# Patient Record
Sex: FEMALE | Race: BLACK OR AFRICAN AMERICAN | NOT HISPANIC OR LATINO | Employment: FULL TIME | URBAN - METROPOLITAN AREA
[De-identification: names, ages, dates, MRNs, and addresses within clinical notes are randomized per-mention and may not be internally consistent; named-entity substitution may affect disease eponyms.]

---

## 2020-12-29 ENCOUNTER — OFFICE VISIT (OUTPATIENT)
Dept: PRIMARY CARE CLINIC | Facility: CLINIC | Age: 45
End: 2020-12-29
Payer: COMMERCIAL

## 2020-12-29 ENCOUNTER — LAB VISIT (OUTPATIENT)
Dept: LAB | Facility: HOSPITAL | Age: 45
End: 2020-12-29
Attending: FAMILY MEDICINE
Payer: COMMERCIAL

## 2020-12-29 VITALS
WEIGHT: 219.81 LBS | TEMPERATURE: 98 F | BODY MASS INDEX: 37.52 KG/M2 | DIASTOLIC BLOOD PRESSURE: 74 MMHG | SYSTOLIC BLOOD PRESSURE: 124 MMHG | OXYGEN SATURATION: 99 % | RESPIRATION RATE: 18 BRPM | HEART RATE: 80 BPM | HEIGHT: 64 IN

## 2020-12-29 DIAGNOSIS — Z00.00 ROUTINE MEDICAL EXAM: ICD-10-CM

## 2020-12-29 DIAGNOSIS — K62.5 BRBPR (BRIGHT RED BLOOD PER RECTUM): ICD-10-CM

## 2020-12-29 DIAGNOSIS — E55.9 VITAMIN D DEFICIENCY DISEASE: ICD-10-CM

## 2020-12-29 DIAGNOSIS — Z00.00 ROUTINE MEDICAL EXAM: Primary | ICD-10-CM

## 2020-12-29 DIAGNOSIS — R73.03 PREDIABETES: ICD-10-CM

## 2020-12-29 DIAGNOSIS — J45.20 MILD INTERMITTENT ASTHMA WITHOUT COMPLICATION: ICD-10-CM

## 2020-12-29 DIAGNOSIS — G43.709 CHRONIC MIGRAINE WITHOUT AURA WITHOUT STATUS MIGRAINOSUS, NOT INTRACTABLE: ICD-10-CM

## 2020-12-29 DIAGNOSIS — Z63.79 PARENT COPING WITH CHILD ILLNESS OR DISABILITY: ICD-10-CM

## 2020-12-29 DIAGNOSIS — R40.0 DAYTIME SOMNOLENCE: ICD-10-CM

## 2020-12-29 DIAGNOSIS — R63.5 WEIGHT GAIN: ICD-10-CM

## 2020-12-29 DIAGNOSIS — E66.09 CLASS 2 OBESITY DUE TO EXCESS CALORIES WITH BODY MASS INDEX (BMI) OF 37.0 TO 37.9 IN ADULT, UNSPECIFIED WHETHER SERIOUS COMORBIDITY PRESENT: ICD-10-CM

## 2020-12-29 DIAGNOSIS — R06.83 SNORING: ICD-10-CM

## 2020-12-29 DIAGNOSIS — F33.41 RECURRENT MAJOR DEPRESSIVE DISORDER, IN PARTIAL REMISSION: ICD-10-CM

## 2020-12-29 LAB
25(OH)D3+25(OH)D2 SERPL-MCNC: 22 NG/ML (ref 30–96)
ALBUMIN SERPL BCP-MCNC: 4 G/DL (ref 3.5–5.2)
ALP SERPL-CCNC: 37 U/L (ref 55–135)
ALT SERPL W/O P-5'-P-CCNC: 17 U/L (ref 10–44)
ANION GAP SERPL CALC-SCNC: 7 MMOL/L (ref 8–16)
AST SERPL-CCNC: 17 U/L (ref 10–40)
BILIRUB SERPL-MCNC: 0.5 MG/DL (ref 0.1–1)
BUN SERPL-MCNC: 10 MG/DL (ref 6–20)
CALCIUM SERPL-MCNC: 9.2 MG/DL (ref 8.7–10.5)
CHLORIDE SERPL-SCNC: 105 MMOL/L (ref 95–110)
CHOLEST SERPL-MCNC: 221 MG/DL (ref 120–199)
CHOLEST/HDLC SERPL: 4.3 {RATIO} (ref 2–5)
CO2 SERPL-SCNC: 26 MMOL/L (ref 23–29)
CREAT SERPL-MCNC: 0.8 MG/DL (ref 0.5–1.4)
ERYTHROCYTE [DISTWIDTH] IN BLOOD BY AUTOMATED COUNT: 14.2 % (ref 11.5–14.5)
EST. GFR  (AFRICAN AMERICAN): >60 ML/MIN/1.73 M^2
EST. GFR  (NON AFRICAN AMERICAN): >60 ML/MIN/1.73 M^2
ESTIMATED AVG GLUCOSE: 114 MG/DL (ref 68–131)
GLUCOSE SERPL-MCNC: 87 MG/DL (ref 70–110)
HBA1C MFR BLD HPLC: 5.6 % (ref 4–5.6)
HCT VFR BLD AUTO: 46.4 % (ref 37–48.5)
HDLC SERPL-MCNC: 52 MG/DL (ref 40–75)
HDLC SERPL: 23.5 % (ref 20–50)
HGB BLD-MCNC: 14.3 G/DL (ref 12–16)
LDLC SERPL CALC-MCNC: 154 MG/DL (ref 63–159)
MCH RBC QN AUTO: 26.9 PG (ref 27–31)
MCHC RBC AUTO-ENTMCNC: 30.8 G/DL (ref 32–36)
MCV RBC AUTO: 87 FL (ref 82–98)
NONHDLC SERPL-MCNC: 169 MG/DL
PLATELET # BLD AUTO: 336 K/UL (ref 150–350)
PMV BLD AUTO: 10.6 FL (ref 9.2–12.9)
POTASSIUM SERPL-SCNC: 4.5 MMOL/L (ref 3.5–5.1)
PROT SERPL-MCNC: 7.4 G/DL (ref 6–8.4)
RBC # BLD AUTO: 5.31 M/UL (ref 4–5.4)
SODIUM SERPL-SCNC: 138 MMOL/L (ref 136–145)
T4 FREE SERPL-MCNC: 0.95 NG/DL (ref 0.71–1.51)
TRIGL SERPL-MCNC: 75 MG/DL (ref 30–150)
TSH SERPL DL<=0.005 MIU/L-ACNC: 1.12 UIU/ML (ref 0.4–4)
WBC # BLD AUTO: 4.84 K/UL (ref 3.9–12.7)

## 2020-12-29 PROCEDURE — 99396 PREV VISIT EST AGE 40-64: CPT | Mod: S$GLB,,, | Performed by: FAMILY MEDICINE

## 2020-12-29 PROCEDURE — 36415 COLL VENOUS BLD VENIPUNCTURE: CPT | Mod: PN

## 2020-12-29 PROCEDURE — 84439 ASSAY OF FREE THYROXINE: CPT

## 2020-12-29 PROCEDURE — 3008F PR BODY MASS INDEX (BMI) DOCUMENTED: ICD-10-PCS | Mod: CPTII,S$GLB,, | Performed by: FAMILY MEDICINE

## 2020-12-29 PROCEDURE — 82306 VITAMIN D 25 HYDROXY: CPT

## 2020-12-29 PROCEDURE — 99999 PR PBB SHADOW E&M-EST. PATIENT-LVL V: CPT | Mod: PBBFAC,,, | Performed by: FAMILY MEDICINE

## 2020-12-29 PROCEDURE — 84443 ASSAY THYROID STIM HORMONE: CPT

## 2020-12-29 PROCEDURE — 99204 OFFICE O/P NEW MOD 45 MIN: CPT | Mod: 25,S$GLB,, | Performed by: FAMILY MEDICINE

## 2020-12-29 PROCEDURE — 83036 HEMOGLOBIN GLYCOSYLATED A1C: CPT

## 2020-12-29 PROCEDURE — 3008F BODY MASS INDEX DOCD: CPT | Mod: CPTII,S$GLB,, | Performed by: FAMILY MEDICINE

## 2020-12-29 PROCEDURE — 85027 COMPLETE CBC AUTOMATED: CPT

## 2020-12-29 PROCEDURE — 80053 COMPREHEN METABOLIC PANEL: CPT

## 2020-12-29 PROCEDURE — 80061 LIPID PANEL: CPT

## 2020-12-29 PROCEDURE — 1126F PR PAIN SEVERITY QUANTIFIED, NO PAIN PRESENT: ICD-10-PCS | Mod: S$GLB,,, | Performed by: FAMILY MEDICINE

## 2020-12-29 PROCEDURE — 99999 PR PBB SHADOW E&M-EST. PATIENT-LVL V: ICD-10-PCS | Mod: PBBFAC,,, | Performed by: FAMILY MEDICINE

## 2020-12-29 PROCEDURE — 1126F AMNT PAIN NOTED NONE PRSNT: CPT | Mod: S$GLB,,, | Performed by: FAMILY MEDICINE

## 2020-12-29 PROCEDURE — 99204 PR OFFICE/OUTPT VISIT, NEW, LEVL IV, 45-59 MIN: ICD-10-PCS | Mod: 25,S$GLB,, | Performed by: FAMILY MEDICINE

## 2020-12-29 PROCEDURE — 99396 PR PREVENTIVE VISIT,EST,40-64: ICD-10-PCS | Mod: S$GLB,,, | Performed by: FAMILY MEDICINE

## 2020-12-29 RX ORDER — ONDANSETRON 4 MG/1
4 TABLET, ORALLY DISINTEGRATING ORAL EVERY 6 HOURS PRN
Qty: 30 TABLET | Refills: 5 | Status: SHIPPED | OUTPATIENT
Start: 2020-12-29

## 2020-12-29 RX ORDER — ERGOCALCIFEROL 1.25 MG/1
50000 CAPSULE ORAL
Qty: 12 CAPSULE | Refills: 12 | Status: SHIPPED | OUTPATIENT
Start: 2020-12-29

## 2020-12-29 RX ORDER — ESCITALOPRAM OXALATE 10 MG/1
10 TABLET ORAL DAILY
Qty: 90 TABLET | Refills: 3 | Status: SHIPPED | OUTPATIENT
Start: 2020-12-29 | End: 2021-04-06

## 2020-12-29 RX ORDER — BUPROPION HYDROCHLORIDE 300 MG/1
TABLET ORAL
COMMUNITY
Start: 2020-10-23 | End: 2020-12-29 | Stop reason: SDUPTHER

## 2020-12-29 RX ORDER — BUPROPION HYDROCHLORIDE 300 MG/1
TABLET ORAL
Qty: 90 TABLET | Refills: 3 | Status: SHIPPED | OUTPATIENT
Start: 2020-12-29 | End: 2021-12-29

## 2020-12-29 RX ORDER — ORAL SEMAGLUTIDE 3 MG/1
TABLET ORAL
COMMUNITY
Start: 2020-11-13 | End: 2020-12-29

## 2020-12-29 NOTE — PROGRESS NOTES
Subjective:   Patient ID: Marie Taylor is a 45 y.o. female.    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    ALLERGIES AND MEDICATIONS: updated and reviewed.  Review of patient's allergies indicates:  No Known Allergies    Current Outpatient Medications:     albuterol 90 mcg/actuation inhaler, Inhale 2 puffs into the lungs every 8 (eight) hours., Disp: 1 Inhaler, Rfl: 12    buPROPion (WELLBUTRIN XL) 300 MG 24 hr tablet, TK 1 T PO  QD, Disp: 90 tablet, Rfl: 3    ergocalciferol (ERGOCALCIFEROL) 50,000 unit Cap, Take 1 capsule (50,000 Units total) by mouth every 7 days., Disp: 12 capsule, Rfl: 12    ASPIRIN/ACETAMINOPHEN/CAFFEINE (EXCEDRIN MIGRAINE ORAL), Take by mouth., Disp: , Rfl:     escitalopram oxalate (LEXAPRO) 10 MG tablet, Take 1 tablet (10 mg total) by mouth once daily., Disp: 90 tablet, Rfl: 3    ondansetron (ZOFRAN-ODT) 4 MG TbDL, Take 1 tablet (4 mg total) by mouth every 6 (six) hours as needed. nausea, Disp: 30 tablet, Rfl: 5    Review of Systems   Constitutional: Negative for activity change, appetite change, chills, diaphoresis, fatigue, fever and unexpected weight change.   HENT: Negative for congestion, ear discharge, ear pain, facial swelling, hearing loss, nosebleeds, postnasal drip, rhinorrhea, sinus pressure, sneezing, sore throat, tinnitus, trouble swallowing and voice change.    Eyes: Negative for photophobia, pain, discharge, redness, itching and visual disturbance.   Respiratory: Negative for cough, chest tightness, shortness of breath and wheezing.    Cardiovascular: Negative for chest pain, palpitations and leg swelling.   Gastrointestinal: Negative for abdominal distention, abdominal pain, anal bleeding, blood in stool, constipation,  diarrhea, nausea, rectal pain and vomiting.   Endocrine: Negative for cold intolerance, heat intolerance, polydipsia, polyphagia and polyuria.   Genitourinary: Negative for difficulty urinating, dysuria and flank pain.   Musculoskeletal: Negative for arthralgias, back pain, joint swelling, myalgias and neck pain.   Skin: Negative for rash.   Neurological: Negative for dizziness, tremors, seizures, syncope, speech difficulty, weakness, light-headedness, numbness and headaches.   Psychiatric/Behavioral: Negative for behavioral problems, confusion, decreased concentration, dysphoric mood, sleep disturbance and suicidal ideas. The patient is not nervous/anxious and is not hyperactive.        Lab Results   Component Value Date    WBC 7.41 02/29/2016    HGB 13.2 02/29/2016    HCT 41.4 02/29/2016    MCV 85 02/29/2016     02/29/2016         CMP  Sodium   Date Value Ref Range Status   02/29/2016 140 136 - 145 mmol/L Final     Potassium   Date Value Ref Range Status   02/29/2016 4.1 3.5 - 5.1 mmol/L Final     Chloride   Date Value Ref Range Status   02/29/2016 106 95 - 110 mmol/L Final     CO2   Date Value Ref Range Status   02/29/2016 27 23 - 29 mmol/L Final     Glucose   Date Value Ref Range Status   02/29/2016 80 70 - 110 mg/dL Final     BUN   Date Value Ref Range Status   02/29/2016 11 6 - 20 mg/dL Final     Creatinine   Date Value Ref Range Status   02/29/2016 0.9 0.5 - 1.4 mg/dL Final     Calcium   Date Value Ref Range Status   02/29/2016 8.8 8.7 - 10.5 mg/dL Final     Total Protein   Date Value Ref Range Status   02/29/2016 7.0 6.0 - 8.4 g/dL Final     Albumin   Date Value Ref Range Status   02/29/2016 3.7 3.5 - 5.2 g/dL Final     Total Bilirubin   Date Value Ref Range Status   02/29/2016 0.2 0.1 - 1.0 mg/dL Final     Comment:     For infants and newborns, interpretation of results should be based  on gestational age, weight and in agreement with clinical  observations.  Premature Infant recommended reference  "ranges:  Up to 24 hours.............<8.0 mg/dL  Up to 48 hours............<12.0 mg/dL  3-5 days..................<15.0 mg/dL  6-29 days.................<15.0 mg/dL       Alkaline Phosphatase   Date Value Ref Range Status   02/29/2016 37 (L) 55 - 135 U/L Final     AST   Date Value Ref Range Status   02/29/2016 15 10 - 40 U/L Final     ALT   Date Value Ref Range Status   02/29/2016 13 10 - 44 U/L Final     Anion Gap   Date Value Ref Range Status   02/29/2016 7 (L) 8 - 16 mmol/L Final     eGFR if    Date Value Ref Range Status   02/29/2016 >60.0 >60 mL/min/1.73 m^2 Final     eGFR if non    Date Value Ref Range Status   02/29/2016 >60.0 >60 mL/min/1.73 m^2 Final     Comment:     Calculation used to obtain the estimated glomerular filtration  rate (eGFR) is the CKD-EPI equation. Since race is unknown   in our information system, the eGFR values for   -American and Non--American patients are given   for each creatinine result.          No results found for: LABA1C, HGBA1C     Objective:     Vitals:    12/29/20 0755   BP: 124/74   Pulse: 80   Resp: 18   Temp: 97.9 °F (36.6 °C)   TempSrc: Oral   SpO2: 99%   Weight: 99.7 kg (219 lb 12.8 oz)   Height: 5' 4" (1.626 m)   PainSc: 0-No pain     Body mass index is 37.73 kg/m².    Physical Exam  Vitals signs and nursing note reviewed.   Constitutional:       General: She is not in acute distress.     Appearance: She is well-developed. She is not diaphoretic.   HENT:      Head: Normocephalic and atraumatic.      Right Ear: Hearing, tympanic membrane, ear canal and external ear normal. No tenderness.      Left Ear: Hearing, tympanic membrane, ear canal and external ear normal. No tenderness.      Nose: Nose normal.   Eyes:      General: Lids are normal. No scleral icterus.        Right eye: No discharge.         Left eye: No discharge.      Extraocular Movements:      Right eye: Normal extraocular motion.      Left eye: Normal extraocular " motion.      Conjunctiva/sclera: Conjunctivae normal.      Right eye: Right conjunctiva is not injected.      Left eye: Left conjunctiva is not injected.      Pupils: Pupils are equal, round, and reactive to light.   Neck:      Musculoskeletal: Normal range of motion and neck supple. No edema.      Thyroid: No thyromegaly.      Vascular: No carotid bruit or JVD.      Trachea: No tracheal deviation.   Cardiovascular:      Rate and Rhythm: Normal rate and regular rhythm.      Pulses: Normal pulses.      Heart sounds: Normal heart sounds. No murmur. No friction rub.   Pulmonary:      Effort: Pulmonary effort is normal. No accessory muscle usage or respiratory distress.      Breath sounds: Normal breath sounds. No wheezing, rhonchi or rales.   Abdominal:      General: Bowel sounds are normal. There is no distension or abdominal bruit.      Palpations: Abdomen is soft. There is no mass or pulsatile mass.      Tenderness: There is no abdominal tenderness. There is no guarding or rebound. Negative signs include Sanches's sign and McBurney's sign.   Lymphadenopathy:      Head:      Right side of head: No submandibular, preauricular or posterior auricular adenopathy.      Left side of head: No submandibular, preauricular or posterior auricular adenopathy.      Cervical: No cervical adenopathy.   Skin:     General: Skin is warm and dry.      Findings: No ecchymosis, erythema or rash. Rash is not urticarial.      Nails: There is no clubbing.     Neurological:      Mental Status: She is alert and oriented to person, place, and time.      GCS: GCS eye subscore is 4. GCS verbal subscore is 5. GCS motor subscore is 6.   Psychiatric:         Mood and Affect: Mood is not anxious or depressed. Affect is not angry or inappropriate.         Speech: Speech normal.         Behavior: Behavior normal. Behavior is cooperative.         Thought Content: Thought content normal.         Assessment and Plan:   Marie was seen today for annual  exam.    Diagnoses and all orders for this visit:    Routine medical exam  -     CBC Without Differential; Future  -     Comprehensive Metabolic Panel; Future  -     Hemoglobin A1C; Future  -     Lipid Panel; Future  -     TSH; Future  -     T4, Free; Future  -     Vitamin D; Future    Health maintenance reviewed.  Mammogram up-to-date.  Get records  Pap up-to-date give records  Fasting labs as above      No follow-ups on file.    ==================================        Very nice 45-year-old female here for multiple complaints including weight gain, major depressive disorder and discussion regarding antidepressant medications, bright red blood per rectum, chronic migraines.  Fatigue and trouble sleeping, stress associated with child illness/disability    Is a very light sleeper. Period afraid to take Benadryl or melatonin because worried about somnolence and she also may have check on her child at night    She has had a rather significant weight gain over the past year.  She has had some increased appetite that may be associated with her SSRI.  This discusses the possibility of switching this medications.  We talked about the pros and cons of switching perhaps Lexapro to Prozac, for example.  No real severe anxiety.    Has chronic migraines.  Does not associate them with her menstrual cycle or any specific foods.  She has about 2 a week.  She does gets any relief with Excedrin migraine.    Has had some bright red blood per rectum.  Nothing severe.  No 1st degree relative with colon cancer      ALLERGIES AND MEDICATIONS: updated and reviewed.  Review of patient's allergies indicates:  No Known Allergies    Current Outpatient Medications:     albuterol 90 mcg/actuation inhaler, Inhale 2 puffs into the lungs every 8 (eight) hours., Disp: 1 Inhaler, Rfl: 12    buPROPion (WELLBUTRIN XL) 300 MG 24 hr tablet, TK 1 T PO  QD, Disp: 90 tablet, Rfl: 3    ergocalciferol (ERGOCALCIFEROL) 50,000 unit Cap, Take 1 capsule  (50,000 Units total) by mouth every 7 days., Disp: 12 capsule, Rfl: 12    ASPIRIN/ACETAMINOPHEN/CAFFEINE (EXCEDRIN MIGRAINE ORAL), Take by mouth., Disp: , Rfl:     escitalopram oxalate (LEXAPRO) 10 MG tablet, Take 1 tablet (10 mg total) by mouth once daily., Disp: 90 tablet, Rfl: 3    ondansetron (ZOFRAN-ODT) 4 MG TbDL, Take 1 tablet (4 mg total) by mouth every 6 (six) hours as needed. nausea, Disp: 30 tablet, Rfl: 5    Review of Systems   Constitutional: Negative for activity change, appetite change, chills, diaphoresis, fatigue, fever and unexpected weight change.   HENT: Negative for congestion, ear discharge, ear pain, facial swelling, hearing loss, nosebleeds, postnasal drip, rhinorrhea, sinus pressure, sneezing, sore throat, tinnitus, trouble swallowing and voice change.    Eyes: Negative for photophobia, pain, discharge, redness, itching and visual disturbance.   Respiratory: Negative for cough, chest tightness, shortness of breath and wheezing.    Cardiovascular: Negative for chest pain, palpitations and leg swelling.   Gastrointestinal: Negative for abdominal distention, abdominal pain, anal bleeding, blood in stool, constipation, diarrhea, nausea, rectal pain and vomiting.   Endocrine: Negative for cold intolerance, heat intolerance, polydipsia, polyphagia and polyuria.   Genitourinary: Negative for difficulty urinating, dysuria and flank pain.   Musculoskeletal: Negative for arthralgias, back pain, joint swelling, myalgias and neck pain.   Skin: Negative for rash.   Neurological: Negative for dizziness, tremors, seizures, syncope, speech difficulty, weakness, light-headedness, numbness and headaches.   Psychiatric/Behavioral: Negative for behavioral problems, confusion, decreased concentration, dysphoric mood, sleep disturbance and suicidal ideas. The patient is not nervous/anxious and is not hyperactive.        Lab Results   Component Value Date    WBC 7.41 02/29/2016    HGB 13.2 02/29/2016    HCT  41.4 02/29/2016    MCV 85 02/29/2016     02/29/2016         CMP  Sodium   Date Value Ref Range Status   02/29/2016 140 136 - 145 mmol/L Final     Potassium   Date Value Ref Range Status   02/29/2016 4.1 3.5 - 5.1 mmol/L Final     Chloride   Date Value Ref Range Status   02/29/2016 106 95 - 110 mmol/L Final     CO2   Date Value Ref Range Status   02/29/2016 27 23 - 29 mmol/L Final     Glucose   Date Value Ref Range Status   02/29/2016 80 70 - 110 mg/dL Final     BUN   Date Value Ref Range Status   02/29/2016 11 6 - 20 mg/dL Final     Creatinine   Date Value Ref Range Status   02/29/2016 0.9 0.5 - 1.4 mg/dL Final     Calcium   Date Value Ref Range Status   02/29/2016 8.8 8.7 - 10.5 mg/dL Final     Total Protein   Date Value Ref Range Status   02/29/2016 7.0 6.0 - 8.4 g/dL Final     Albumin   Date Value Ref Range Status   02/29/2016 3.7 3.5 - 5.2 g/dL Final     Total Bilirubin   Date Value Ref Range Status   02/29/2016 0.2 0.1 - 1.0 mg/dL Final     Comment:     For infants and newborns, interpretation of results should be based  on gestational age, weight and in agreement with clinical  observations.  Premature Infant recommended reference ranges:  Up to 24 hours.............<8.0 mg/dL  Up to 48 hours............<12.0 mg/dL  3-5 days..................<15.0 mg/dL  6-29 days.................<15.0 mg/dL       Alkaline Phosphatase   Date Value Ref Range Status   02/29/2016 37 (L) 55 - 135 U/L Final     AST   Date Value Ref Range Status   02/29/2016 15 10 - 40 U/L Final     ALT   Date Value Ref Range Status   02/29/2016 13 10 - 44 U/L Final     Anion Gap   Date Value Ref Range Status   02/29/2016 7 (L) 8 - 16 mmol/L Final     eGFR if    Date Value Ref Range Status   02/29/2016 >60.0 >60 mL/min/1.73 m^2 Final     eGFR if non    Date Value Ref Range Status   02/29/2016 >60.0 >60 mL/min/1.73 m^2 Final     Comment:     Calculation used to obtain the estimated glomerular filtration  rate  "(eGFR) is the CKD-EPI equation. Since race is unknown   in our information system, the eGFR values for   -American and Non--American patients are given   for each creatinine result.          No results found for: LABA1C, HGBA1C     Objective:     Vitals:    12/29/20 0755   BP: 124/74   Pulse: 80   Resp: 18   Temp: 97.9 °F (36.6 °C)   TempSrc: Oral   SpO2: 99%   Weight: 99.7 kg (219 lb 12.8 oz)   Height: 5' 4" (1.626 m)   PainSc: 0-No pain     Body mass index is 37.73 kg/m².    Physical Exam  Vitals signs and nursing note reviewed.   Constitutional:       General: She is not in acute distress.     Appearance: She is well-developed. She is not diaphoretic.   HENT:      Head: Normocephalic and atraumatic.      Right Ear: Hearing, tympanic membrane, ear canal and external ear normal. No tenderness.      Left Ear: Hearing, tympanic membrane, ear canal and external ear normal. No tenderness.      Nose: Nose normal.   Eyes:      General: Lids are normal. No scleral icterus.        Right eye: No discharge.         Left eye: No discharge.      Extraocular Movements:      Right eye: Normal extraocular motion.      Left eye: Normal extraocular motion.      Conjunctiva/sclera: Conjunctivae normal.      Right eye: Right conjunctiva is not injected.      Left eye: Left conjunctiva is not injected.      Pupils: Pupils are equal, round, and reactive to light.   Neck:      Musculoskeletal: Normal range of motion and neck supple. No edema.      Thyroid: No thyromegaly.      Vascular: No carotid bruit or JVD.      Trachea: No tracheal deviation.   Cardiovascular:      Rate and Rhythm: Normal rate and regular rhythm.      Pulses: Normal pulses.      Heart sounds: Normal heart sounds. No murmur. No friction rub.   Pulmonary:      Effort: Pulmonary effort is normal. No accessory muscle usage or respiratory distress.      Breath sounds: Normal breath sounds. No wheezing, rhonchi or rales.   Abdominal:      General: Bowel " sounds are normal. There is no distension or abdominal bruit.      Palpations: Abdomen is soft. There is no mass or pulsatile mass.      Tenderness: There is no abdominal tenderness. There is no guarding or rebound. Negative signs include Sanches's sign and McBurney's sign.   Lymphadenopathy:      Head:      Right side of head: No submandibular, preauricular or posterior auricular adenopathy.      Left side of head: No submandibular, preauricular or posterior auricular adenopathy.      Cervical: No cervical adenopathy.   Skin:     General: Skin is warm and dry.      Findings: No ecchymosis, erythema or rash. Rash is not urticarial.      Nails: There is no clubbing.     Neurological:      Mental Status: She is alert and oriented to person, place, and time.      GCS: GCS eye subscore is 4. GCS verbal subscore is 5. GCS motor subscore is 6.   Psychiatric:         Mood and Affect: Mood is not anxious or depressed. Affect is not angry or inappropriate.         Speech: Speech normal.         Behavior: Behavior normal. Behavior is cooperative.         Thought Content: Thought content normal.         Assessment and Plan:   Marie was seen today for multiple problems/complaint    Chronic migraine  -     Ambulatory referral/consult to Neurology; Future  For now, continue Excedrin migraine.  Consider over-the-counter Pepcid to take with this and especially take it with food and water.  Zofran can be used if she is having nausea which she does frequently have      Snoring  -     Ambulatory referral/consult to Sleep Disorders; Future  Weight loss is also key  Daytime somnolence    Check labs including CBC and thyroid studies.  Referral to Sleep Disorders as above    BRBPR (bright red blood per rectum)  -     Ambulatory referral/consult to Gastroenterology; Future  Stay regular.  Increase water and fiber in diet.  Consider Metamucil.  Stool softener if still seems constipated.  Cannot rule out something more serious and  therefore referral      Class 2 obesity due to excess calories with body mass index (BMI) of 37.0 to 37.9 in adult, unspecified whether serious comorbidity present  Calculate basal metabolic rate.  Calculate calories that a reasonable to lose 1 up to 1-1/2 lb per week.  Exercise 5 days per week vigorously for 20-30 minutes at least      Vitamin D deficiency disease  Vitamin-D supplementation    Calcium supplementation for bone health    Mild intermittent asthma without complication  Stable.  Albuterol as needed.  Can consider Zyrtec/Allegra and Flonase when having more allergic rhinitis symptoms      Chronic migraine without aura without status migrainosus, not intractable  Magnesium oxide 400 mg daily.  Referral as above given frequency of her headaches    Recurrent major depressive disorder, in partial remission    Discussed pros and cons of changing medications.  Will continue on with Lexapro and Wellbutrin    Parent coping with child illness or disability  Exercise, deep breathing exercises.  Consider psychotherapy  Weight gain  See above.  Thyroid studies.  Exercise, calorie restriction      Other orders  -     ergocalciferol (ERGOCALCIFEROL) 50,000 unit Cap; Take 1 capsule (50,000 Units total) by mouth every 7 days.  -     buPROPion (WELLBUTRIN XL) 300 MG 24 hr tablet; TK 1 T PO  QD  -     escitalopram oxalate (LEXAPRO) 10 MG tablet; Take 1 tablet (10 mg total) by mouth once daily.  -     ondansetron (ZOFRAN-ODT) 4 MG TbDL; Take 1 tablet (4 mg total) by mouth every 6 (six) hours as needed. nausea        No follow-ups on file.    ==================================

## 2021-01-06 ENCOUNTER — PATIENT MESSAGE (OUTPATIENT)
Dept: PRIMARY CARE CLINIC | Facility: CLINIC | Age: 46
End: 2021-01-06

## 2021-01-11 ENCOUNTER — PATIENT MESSAGE (OUTPATIENT)
Dept: PRIMARY CARE CLINIC | Facility: CLINIC | Age: 46
End: 2021-01-11

## 2021-01-11 RX ORDER — ROSUVASTATIN CALCIUM 10 MG/1
10 TABLET, COATED ORAL DAILY
Qty: 90 TABLET | Refills: 3 | Status: SHIPPED | OUTPATIENT
Start: 2021-01-11 | End: 2022-01-11

## 2021-02-01 LAB — HPV OTHER: NEGATIVE

## 2021-02-22 ENCOUNTER — CLINICAL SUPPORT (OUTPATIENT)
Dept: REHABILITATION | Facility: HOSPITAL | Age: 46
End: 2021-02-22
Payer: COMMERCIAL

## 2021-02-22 DIAGNOSIS — R53.1 WEAKNESS: Primary | ICD-10-CM

## 2021-02-22 DIAGNOSIS — M25.521 ELBOW PAIN, RIGHT: ICD-10-CM

## 2021-02-22 PROCEDURE — 97530 THERAPEUTIC ACTIVITIES: CPT

## 2021-02-22 PROCEDURE — 97161 PT EVAL LOW COMPLEX 20 MIN: CPT

## 2021-02-22 PROCEDURE — 97140 MANUAL THERAPY 1/> REGIONS: CPT

## 2021-03-05 ENCOUNTER — CLINICAL SUPPORT (OUTPATIENT)
Dept: REHABILITATION | Facility: HOSPITAL | Age: 46
End: 2021-03-05
Payer: COMMERCIAL

## 2021-03-05 DIAGNOSIS — M25.521 ELBOW PAIN, RIGHT: ICD-10-CM

## 2021-03-05 DIAGNOSIS — R53.1 WEAKNESS: ICD-10-CM

## 2021-03-05 PROCEDURE — 97530 THERAPEUTIC ACTIVITIES: CPT

## 2021-03-05 PROCEDURE — 97110 THERAPEUTIC EXERCISES: CPT

## 2021-03-05 PROCEDURE — 97140 MANUAL THERAPY 1/> REGIONS: CPT

## 2021-03-12 ENCOUNTER — CLINICAL SUPPORT (OUTPATIENT)
Dept: REHABILITATION | Facility: HOSPITAL | Age: 46
End: 2021-03-12
Payer: COMMERCIAL

## 2021-03-12 DIAGNOSIS — R53.1 WEAKNESS: ICD-10-CM

## 2021-03-12 DIAGNOSIS — M25.521 ELBOW PAIN, RIGHT: ICD-10-CM

## 2021-03-12 PROCEDURE — 97530 THERAPEUTIC ACTIVITIES: CPT

## 2021-03-12 PROCEDURE — 97110 THERAPEUTIC EXERCISES: CPT

## 2021-03-12 PROCEDURE — 97140 MANUAL THERAPY 1/> REGIONS: CPT

## 2021-03-15 ENCOUNTER — CLINICAL SUPPORT (OUTPATIENT)
Dept: REHABILITATION | Facility: HOSPITAL | Age: 46
End: 2021-03-15
Payer: COMMERCIAL

## 2021-03-15 DIAGNOSIS — R53.1 WEAKNESS: ICD-10-CM

## 2021-03-15 DIAGNOSIS — M25.521 ELBOW PAIN, RIGHT: ICD-10-CM

## 2021-03-15 PROCEDURE — 97530 THERAPEUTIC ACTIVITIES: CPT

## 2021-03-15 PROCEDURE — 97110 THERAPEUTIC EXERCISES: CPT

## 2021-03-15 PROCEDURE — 97140 MANUAL THERAPY 1/> REGIONS: CPT

## 2021-03-19 ENCOUNTER — CLINICAL SUPPORT (OUTPATIENT)
Dept: REHABILITATION | Facility: HOSPITAL | Age: 46
End: 2021-03-19
Attending: FAMILY MEDICINE
Payer: COMMERCIAL

## 2021-03-19 DIAGNOSIS — R53.1 WEAKNESS: ICD-10-CM

## 2021-03-19 DIAGNOSIS — M25.521 ELBOW PAIN, RIGHT: ICD-10-CM

## 2021-03-19 PROCEDURE — 97530 THERAPEUTIC ACTIVITIES: CPT

## 2021-03-19 PROCEDURE — 97140 MANUAL THERAPY 1/> REGIONS: CPT

## 2021-03-19 PROCEDURE — 97110 THERAPEUTIC EXERCISES: CPT

## 2021-03-24 ENCOUNTER — PATIENT OUTREACH (OUTPATIENT)
Dept: ADMINISTRATIVE | Facility: HOSPITAL | Age: 46
End: 2021-03-24

## 2021-03-26 ENCOUNTER — PATIENT OUTREACH (OUTPATIENT)
Dept: ADMINISTRATIVE | Facility: HOSPITAL | Age: 46
End: 2021-03-26

## 2021-03-29 ENCOUNTER — CLINICAL SUPPORT (OUTPATIENT)
Dept: REHABILITATION | Facility: HOSPITAL | Age: 46
End: 2021-03-29
Attending: ORTHOPAEDIC SURGERY
Payer: COMMERCIAL

## 2021-03-29 DIAGNOSIS — R53.1 WEAKNESS: ICD-10-CM

## 2021-03-29 DIAGNOSIS — M25.521 ELBOW PAIN, RIGHT: ICD-10-CM

## 2021-03-29 PROCEDURE — 97530 THERAPEUTIC ACTIVITIES: CPT

## 2021-03-29 PROCEDURE — 97110 THERAPEUTIC EXERCISES: CPT

## 2021-03-29 PROCEDURE — 97140 MANUAL THERAPY 1/> REGIONS: CPT

## 2021-04-06 ENCOUNTER — OFFICE VISIT (OUTPATIENT)
Dept: NEUROLOGY | Facility: CLINIC | Age: 46
End: 2021-04-06
Payer: COMMERCIAL

## 2021-04-06 VITALS
HEART RATE: 94 BPM | BODY MASS INDEX: 37.39 KG/M2 | HEIGHT: 64 IN | WEIGHT: 219 LBS | DIASTOLIC BLOOD PRESSURE: 85 MMHG | SYSTOLIC BLOOD PRESSURE: 123 MMHG

## 2021-04-06 DIAGNOSIS — R41.3 MEMORY CHANGE: ICD-10-CM

## 2021-04-06 DIAGNOSIS — R29.818 SUSPECTED SLEEP APNEA: ICD-10-CM

## 2021-04-06 DIAGNOSIS — R11.0 NAUSEA: ICD-10-CM

## 2021-04-06 DIAGNOSIS — G43.009 MIGRAINE WITHOUT AURA AND WITHOUT STATUS MIGRAINOSUS, NOT INTRACTABLE: Primary | ICD-10-CM

## 2021-04-06 PROCEDURE — 1126F AMNT PAIN NOTED NONE PRSNT: CPT | Mod: S$GLB,,, | Performed by: PHYSICIAN ASSISTANT

## 2021-04-06 PROCEDURE — 3008F PR BODY MASS INDEX (BMI) DOCUMENTED: ICD-10-PCS | Mod: CPTII,S$GLB,, | Performed by: PHYSICIAN ASSISTANT

## 2021-04-06 PROCEDURE — 1126F PR PAIN SEVERITY QUANTIFIED, NO PAIN PRESENT: ICD-10-PCS | Mod: S$GLB,,, | Performed by: PHYSICIAN ASSISTANT

## 2021-04-06 PROCEDURE — 99204 OFFICE O/P NEW MOD 45 MIN: CPT | Mod: S$GLB,,, | Performed by: PHYSICIAN ASSISTANT

## 2021-04-06 PROCEDURE — 99204 PR OFFICE/OUTPT VISIT, NEW, LEVL IV, 45-59 MIN: ICD-10-PCS | Mod: S$GLB,,, | Performed by: PHYSICIAN ASSISTANT

## 2021-04-06 PROCEDURE — 99999 PR PBB SHADOW E&M-EST. PATIENT-LVL IV: CPT | Mod: PBBFAC,,, | Performed by: PHYSICIAN ASSISTANT

## 2021-04-06 PROCEDURE — 99999 PR PBB SHADOW E&M-EST. PATIENT-LVL IV: ICD-10-PCS | Mod: PBBFAC,,, | Performed by: PHYSICIAN ASSISTANT

## 2021-04-06 PROCEDURE — 3008F BODY MASS INDEX DOCD: CPT | Mod: CPTII,S$GLB,, | Performed by: PHYSICIAN ASSISTANT

## 2021-04-09 ENCOUNTER — CLINICAL SUPPORT (OUTPATIENT)
Dept: REHABILITATION | Facility: HOSPITAL | Age: 46
End: 2021-04-09
Payer: COMMERCIAL

## 2021-04-09 DIAGNOSIS — M25.521 ELBOW PAIN, RIGHT: ICD-10-CM

## 2021-04-09 DIAGNOSIS — R53.1 WEAKNESS: ICD-10-CM

## 2021-04-09 PROCEDURE — 97140 MANUAL THERAPY 1/> REGIONS: CPT

## 2021-04-09 PROCEDURE — 97530 THERAPEUTIC ACTIVITIES: CPT

## 2021-04-09 PROCEDURE — 97110 THERAPEUTIC EXERCISES: CPT

## 2021-04-12 ENCOUNTER — CLINICAL SUPPORT (OUTPATIENT)
Dept: REHABILITATION | Facility: HOSPITAL | Age: 46
End: 2021-04-12
Payer: COMMERCIAL

## 2021-04-12 DIAGNOSIS — R53.1 WEAKNESS: ICD-10-CM

## 2021-04-12 DIAGNOSIS — M25.521 ELBOW PAIN, RIGHT: ICD-10-CM

## 2021-04-12 PROCEDURE — 97530 THERAPEUTIC ACTIVITIES: CPT

## 2021-04-12 PROCEDURE — 97140 MANUAL THERAPY 1/> REGIONS: CPT

## 2021-04-12 PROCEDURE — 97110 THERAPEUTIC EXERCISES: CPT

## 2021-04-16 ENCOUNTER — CLINICAL SUPPORT (OUTPATIENT)
Dept: REHABILITATION | Facility: HOSPITAL | Age: 46
End: 2021-04-16
Payer: COMMERCIAL

## 2021-04-16 ENCOUNTER — PATIENT MESSAGE (OUTPATIENT)
Dept: RESEARCH | Facility: HOSPITAL | Age: 46
End: 2021-04-16

## 2021-04-16 DIAGNOSIS — R53.1 WEAKNESS: ICD-10-CM

## 2021-04-16 DIAGNOSIS — M25.521 ELBOW PAIN, RIGHT: ICD-10-CM

## 2021-04-16 PROCEDURE — 97140 MANUAL THERAPY 1/> REGIONS: CPT

## 2021-04-16 PROCEDURE — 97110 THERAPEUTIC EXERCISES: CPT

## 2021-04-16 PROCEDURE — 97530 THERAPEUTIC ACTIVITIES: CPT

## 2021-04-23 ENCOUNTER — CLINICAL SUPPORT (OUTPATIENT)
Dept: REHABILITATION | Facility: HOSPITAL | Age: 46
End: 2021-04-23
Payer: COMMERCIAL

## 2021-04-23 DIAGNOSIS — M25.521 ELBOW PAIN, RIGHT: ICD-10-CM

## 2021-04-23 DIAGNOSIS — R53.1 WEAKNESS: ICD-10-CM

## 2021-04-23 PROCEDURE — 97530 THERAPEUTIC ACTIVITIES: CPT

## 2021-04-23 PROCEDURE — 97110 THERAPEUTIC EXERCISES: CPT

## 2021-04-30 ENCOUNTER — CLINICAL SUPPORT (OUTPATIENT)
Dept: REHABILITATION | Facility: HOSPITAL | Age: 46
End: 2021-04-30
Payer: COMMERCIAL

## 2021-04-30 DIAGNOSIS — M25.521 ELBOW PAIN, RIGHT: ICD-10-CM

## 2021-04-30 DIAGNOSIS — R53.1 WEAKNESS: ICD-10-CM

## 2021-04-30 PROCEDURE — 97530 THERAPEUTIC ACTIVITIES: CPT

## 2021-04-30 PROCEDURE — 97110 THERAPEUTIC EXERCISES: CPT

## 2021-05-05 ENCOUNTER — CLINICAL SUPPORT (OUTPATIENT)
Dept: REHABILITATION | Facility: HOSPITAL | Age: 46
End: 2021-05-05
Attending: FAMILY MEDICINE
Payer: COMMERCIAL

## 2021-05-05 DIAGNOSIS — M25.521 ELBOW PAIN, RIGHT: ICD-10-CM

## 2021-05-05 DIAGNOSIS — R53.1 WEAKNESS: ICD-10-CM

## 2021-05-05 PROCEDURE — 97530 THERAPEUTIC ACTIVITIES: CPT

## 2021-05-05 PROCEDURE — 97110 THERAPEUTIC EXERCISES: CPT

## 2021-05-07 ENCOUNTER — CLINICAL SUPPORT (OUTPATIENT)
Dept: REHABILITATION | Facility: HOSPITAL | Age: 46
End: 2021-05-07
Payer: COMMERCIAL

## 2021-05-07 DIAGNOSIS — M25.521 ELBOW PAIN, RIGHT: ICD-10-CM

## 2021-05-07 DIAGNOSIS — R53.1 WEAKNESS: ICD-10-CM

## 2021-05-07 PROCEDURE — 97530 THERAPEUTIC ACTIVITIES: CPT

## 2021-05-07 PROCEDURE — 97110 THERAPEUTIC EXERCISES: CPT

## 2021-05-12 ENCOUNTER — CLINICAL SUPPORT (OUTPATIENT)
Dept: REHABILITATION | Facility: HOSPITAL | Age: 46
End: 2021-05-12
Payer: COMMERCIAL

## 2021-05-12 DIAGNOSIS — R53.1 WEAKNESS: ICD-10-CM

## 2021-05-12 DIAGNOSIS — M25.521 ELBOW PAIN, RIGHT: ICD-10-CM

## 2021-05-12 PROCEDURE — 97530 THERAPEUTIC ACTIVITIES: CPT

## 2021-05-12 PROCEDURE — 97140 MANUAL THERAPY 1/> REGIONS: CPT

## 2021-05-12 PROCEDURE — 97110 THERAPEUTIC EXERCISES: CPT

## 2021-05-14 ENCOUNTER — CLINICAL SUPPORT (OUTPATIENT)
Dept: REHABILITATION | Facility: HOSPITAL | Age: 46
End: 2021-05-14
Payer: COMMERCIAL

## 2021-05-14 DIAGNOSIS — M25.521 ELBOW PAIN, RIGHT: ICD-10-CM

## 2021-05-14 DIAGNOSIS — R53.1 WEAKNESS: ICD-10-CM

## 2021-05-14 PROCEDURE — 97530 THERAPEUTIC ACTIVITIES: CPT

## 2021-05-14 PROCEDURE — 97110 THERAPEUTIC EXERCISES: CPT

## 2021-05-19 ENCOUNTER — CLINICAL SUPPORT (OUTPATIENT)
Dept: REHABILITATION | Facility: HOSPITAL | Age: 46
End: 2021-05-19
Payer: COMMERCIAL

## 2021-05-19 DIAGNOSIS — R53.1 WEAKNESS: ICD-10-CM

## 2021-05-19 DIAGNOSIS — M25.521 ELBOW PAIN, RIGHT: ICD-10-CM

## 2021-05-19 PROCEDURE — 97530 THERAPEUTIC ACTIVITIES: CPT

## 2021-05-19 PROCEDURE — 97110 THERAPEUTIC EXERCISES: CPT

## 2021-05-21 ENCOUNTER — CLINICAL SUPPORT (OUTPATIENT)
Dept: REHABILITATION | Facility: HOSPITAL | Age: 46
End: 2021-05-21
Payer: COMMERCIAL

## 2021-05-21 DIAGNOSIS — R53.1 WEAKNESS: ICD-10-CM

## 2021-05-21 DIAGNOSIS — M25.521 ELBOW PAIN, RIGHT: ICD-10-CM

## 2021-05-21 PROCEDURE — 97530 THERAPEUTIC ACTIVITIES: CPT

## 2021-05-21 PROCEDURE — 97110 THERAPEUTIC EXERCISES: CPT

## 2021-05-26 ENCOUNTER — CLINICAL SUPPORT (OUTPATIENT)
Dept: REHABILITATION | Facility: HOSPITAL | Age: 46
End: 2021-05-26
Payer: COMMERCIAL

## 2021-05-26 DIAGNOSIS — M25.521 ELBOW PAIN, RIGHT: ICD-10-CM

## 2021-05-26 DIAGNOSIS — R53.1 WEAKNESS: ICD-10-CM

## 2021-05-26 PROCEDURE — 97110 THERAPEUTIC EXERCISES: CPT

## 2021-05-26 PROCEDURE — 97530 THERAPEUTIC ACTIVITIES: CPT

## 2021-06-04 ENCOUNTER — CLINICAL SUPPORT (OUTPATIENT)
Dept: REHABILITATION | Facility: HOSPITAL | Age: 46
End: 2021-06-04
Payer: COMMERCIAL

## 2021-06-04 DIAGNOSIS — M25.521 ELBOW PAIN, RIGHT: ICD-10-CM

## 2021-06-04 DIAGNOSIS — R53.1 WEAKNESS: ICD-10-CM

## 2021-06-04 PROCEDURE — 97530 THERAPEUTIC ACTIVITIES: CPT

## 2021-06-04 PROCEDURE — 97110 THERAPEUTIC EXERCISES: CPT

## 2021-09-03 ENCOUNTER — PATIENT MESSAGE (OUTPATIENT)
Dept: NEUROLOGY | Facility: CLINIC | Age: 46
End: 2021-09-03

## 2022-01-18 ENCOUNTER — PATIENT MESSAGE (OUTPATIENT)
Dept: ADMINISTRATIVE | Facility: HOSPITAL | Age: 47
End: 2022-01-18
Payer: MEDICAID

## 2022-03-13 DIAGNOSIS — Z12.31 OTHER SCREENING MAMMOGRAM: ICD-10-CM

## 2022-05-10 ENCOUNTER — OFFICE VISIT (OUTPATIENT)
Dept: SLEEP MEDICINE | Facility: CLINIC | Age: 47
End: 2022-05-10
Payer: MEDICAID

## 2022-05-10 DIAGNOSIS — R35.1 NOCTURIA: ICD-10-CM

## 2022-05-10 DIAGNOSIS — F51.09 OTHER INSOMNIA NOT DUE TO A SUBSTANCE OR KNOWN PHYSIOLOGICAL CONDITION: ICD-10-CM

## 2022-05-10 DIAGNOSIS — G47.10 HYPERSOMNOLENCE: ICD-10-CM

## 2022-05-10 DIAGNOSIS — G47.33 OSA (OBSTRUCTIVE SLEEP APNEA): Primary | ICD-10-CM

## 2022-05-10 PROCEDURE — 99211 OFF/OP EST MAY X REQ PHY/QHP: CPT | Mod: PBBFAC | Performed by: INTERNAL MEDICINE

## 2022-05-10 PROCEDURE — 1159F PR MEDICATION LIST DOCUMENTED IN MEDICAL RECORD: ICD-10-PCS | Mod: CPTII,,, | Performed by: INTERNAL MEDICINE

## 2022-05-10 PROCEDURE — 1159F MED LIST DOCD IN RCRD: CPT | Mod: CPTII,,, | Performed by: INTERNAL MEDICINE

## 2022-05-10 PROCEDURE — 99999 PR PBB SHADOW E&M-EST. PATIENT-LVL I: CPT | Mod: PBBFAC,,, | Performed by: INTERNAL MEDICINE

## 2022-05-10 PROCEDURE — 99204 OFFICE O/P NEW MOD 45 MIN: CPT | Mod: S$PBB,,, | Performed by: INTERNAL MEDICINE

## 2022-05-10 PROCEDURE — 99204 PR OFFICE/OUTPT VISIT, NEW, LEVL IV, 45-59 MIN: ICD-10-PCS | Mod: S$PBB,,, | Performed by: INTERNAL MEDICINE

## 2022-05-10 PROCEDURE — 99999 PR PBB SHADOW E&M-EST. PATIENT-LVL I: ICD-10-PCS | Mod: PBBFAC,,, | Performed by: INTERNAL MEDICINE

## 2022-05-10 NOTE — PROGRESS NOTES
"NEW PATIENT VISIT  Referred by Self, Piercereferral     Marie Taylor  is a pleasant 46 y.o. female  with PMH significant for migraines, HLD, asthma, depression who presents for evaluation of potential sleep apnea. Referred by neurology, hx of migraines since teenage years, more frequent recently. Reports daytime sleepiness.    Prior sleep studies:  none    Insomnia?:  Issues with initiation and maintenance  Hypnotic use?: none    Bed partner:   , but different sleep times related to work schedules  Witnessed snoring ?:  Family has witnessed snoring  Snoring arousals?: yes  Witnessed apneas? none    H/H hallucinations?: none  Sleep paralysis?: none  Cataplexy?:  none    RLS?:   none    Sleepy if inactive?: yes  Sleepiness driving: Yes; avoids driving when sleepy  ESS:         SLEEP SCHEDULE   Bed Time "random"; 10PM-1AM   Sleep Latency 45 minutes- 2 hours   Arousals 1-2x   Back to sleep 30-45 minutes   Wake time 5:30AM   Naps none   Nocturia 2-3x per night   Work      There were no vitals filed for this visit.    Physical Exam:    GEN:   Well-appearing  Psych:  Appropriate affect, demonstrates insight  SKIN:  No rash on the face or bridge of the nose  HEENT: MP4, + base of the tongue occludes a significant portion of the oropharynx    LABS:   No results found for: HGB, CO2    RECORDS REVIEWED PREVIOUSLY:    No prior sleep testing.    ASSESSMENT    No flowsheet data found.  PROBLEM DESCRIPTION/ Sx on Presentation  STATUS   possible ARTURO   + snoring, snoring arousals, no witnessed apnes    New   Daytime Sx   + sleepiness when inactive   denies sleepiness when driving   ESS 11/24 on intake  New   Insomnia   Trouble falling and staying asleep  Prior hypnotics:      n/a  Current hypnotics: n/a    New   Nocturia   x 2-3 per sleep period  New   Headaches   About twice per week upon awaken  About once per week has headache that starts during the day  New   Other issues:  migraine, Vit D def, elevated BMI    PLAN "     -recommend sleep testing   -discussed trial therapy if ARTURO present and the patient is  open to a trial of CPAP therapy  -driving precautions were discussed with the patient    RTC          The patient was given open opportunity to ask questions and/or express concerns about treatment plan.   All questions/concerns were discussed.     Two patient identifiers used prior to evaluation.

## 2022-05-11 ENCOUNTER — TELEPHONE (OUTPATIENT)
Dept: SLEEP MEDICINE | Facility: OTHER | Age: 47
End: 2022-05-11
Payer: MEDICAID

## 2022-05-13 ENCOUNTER — HOSPITAL ENCOUNTER (OUTPATIENT)
Dept: SLEEP MEDICINE | Facility: OTHER | Age: 47
Discharge: HOME | End: 2022-05-13
Attending: INTERNAL MEDICINE
Payer: MEDICAID

## 2022-05-13 DIAGNOSIS — G47.33 OSA (OBSTRUCTIVE SLEEP APNEA): ICD-10-CM

## 2022-05-13 DIAGNOSIS — F51.09 OTHER INSOMNIA NOT DUE TO A SUBSTANCE OR KNOWN PHYSIOLOGICAL CONDITION: ICD-10-CM

## 2022-05-13 PROCEDURE — 95806 SLEEP STUDY UNATT&RESP EFFT: CPT | Mod: 26,,, | Performed by: INTERNAL MEDICINE

## 2022-05-13 PROCEDURE — 95806 PR SLEEP STUDY, UNATTENDED, SIMUL RECORD HR/O2 SAT/RESP FLOW/RESP EFFT: ICD-10-PCS | Mod: 26,,, | Performed by: INTERNAL MEDICINE

## 2022-05-13 PROCEDURE — 95800 SLP STDY UNATTENDED: CPT

## 2022-05-23 ENCOUNTER — PATIENT MESSAGE (OUTPATIENT)
Dept: SLEEP MEDICINE | Facility: CLINIC | Age: 47
End: 2022-05-23
Payer: MEDICAID

## 2022-05-23 DIAGNOSIS — G47.33 OSA (OBSTRUCTIVE SLEEP APNEA): Primary | ICD-10-CM

## 2022-06-19 NOTE — TELEPHONE ENCOUNTER
Care Due:                  Date            Visit Type   Department     Provider  --------------------------------------------------------------------------------                                NP -                              PRIMARY      [facility]  Last Visit: 12-      CARE ([facility])   BLADIMIR Sweet  Next Visit: None Scheduled  None         None Found                                                            Last  Test          Frequency    Reason                     Performed    Due Date  --------------------------------------------------------------------------------    Office Visit  12 months..  buPROPion................  12- 12-    [facility] Embedded Care Gaps. Reference number: [id number]. 6/19/2022   6:12:52 AM CDT

## 2022-06-20 NOTE — TELEPHONE ENCOUNTER
Refill Routing Note   Medication(s) are not appropriate for processing by [facility] for the following reason(s):      - Patient has not been seen in over 15 months by PCP  - Required vitals are outdated    [facility] action(s):  Defer Medication-related problems identified: Requires appointment        Medication reconciliation completed: No     Appointments  past 12m or future 3m with PCP    Date Provider   Last Visit   12/29/2020 Reji Sweet MD   Next Visit   Visit date not found Reji Sweet MD   ED visits in past 90 days: 0        Note composed:10:54 AM 06/20/2022

## 2022-06-21 RX ORDER — BUPROPION HYDROCHLORIDE 300 MG/1
TABLET ORAL
Qty: 90 TABLET | Refills: 0 | OUTPATIENT
Start: 2022-06-21

## 2022-06-22 NOTE — TELEPHONE ENCOUNTER
Provider Staff:     Action required for this patient.    Please note Refusal of medication.            Requested Prescriptions     Refused Prescriptions Disp Refills    buPROPion (WELLBUTRIN XL) 300 MG 24 hr tablet [Pharmacy Med Name: BUPROPION XL 300MG TABLETS] 90 tablet 0     Sig: TAKE 1 TABLET BY MOUTH EVERY DAY     Refused By: PITO ALBERTO     Reason for Refusal: Patient needs an appointment      Thanks!  [facility]   Note composed: 06/22/2022 8:56 AM

## 2022-08-30 ENCOUNTER — TELEPHONE (OUTPATIENT)
Dept: SLEEP MEDICINE | Facility: CLINIC | Age: 47
End: 2022-08-30
Payer: MEDICAID

## 2022-08-30 NOTE — PROGRESS NOTES
"The patient location is: [place]  The chief complaint leading to consultation is: ARTURO    Visit type: audiovisual    20 minutes of total time spent on the encounter, which includes face to face time and non-face to face time preparing to see the patient (eg, review of tests), Obtaining and/or reviewing separately obtained history, Documenting clinical information in the electronic or other health record, Independently interpreting results (not separately reported) and communicating results to the patient/family/caregiver, or Care coordination (not separately reported).     Each patient to whom he or she provides medical services by telemedicine is:  (1) informed of the relationship between the physician and patient and the respective role of any other health care provider with respect to management of the patient; and (2) notified that he or she may decline to receive medical services by telemedicine and may withdraw from such care at any time.    ESTABLISHED PATIENT VISIT    Marie Taylor  is a pleasant 46 y.o. female  with PMH significant for migraines, HLD, asthma, depression who presented 2022 for evaluation of potential sleep apnea with headaches and daytime sleepiness.    Here today for CPAP follow-up    PLAN last visit:   -recommend sleep testing   -discussed trial therapy if ARTURO present and the patient is  open to a trial of CPAP therapy  -driving precautions were discussed with the patient    Since last visit:   HST 5.13.22: AHI 8, RDI 23, (likely moderate) all hypopneas  She hates her machine  She feels worse with her machine  Takes it off during the night      PAP history   Problems    Mask Dreamwear nasal , mouth opening  FFM - feels like she can't breathe   Pressure ?   Benefit    DME HME   Machine age 2022   Download N/a       SLEEP SCHEDULE   Bed Time "random"; 10PM-1AM   Sleep Latency 45 minutes- 2 hours   Arousals 1-2x   Back to sleep 30-45 minutes   Wake time 5:30AM   Naps none   Nocturia 2-3x " per night   Work      There were no vitals filed for this visit.    Physical Exam:    GEN:   Well-appearing  Psych:  Appropriate affect, demonstrates insight  SKIN:  No rash on the face or bridge of the nose  HEENT: MP4, + base of the tongue occludes a significant portion of the oropharynx    LABS:   No results found for: HGB, CO2    RECORDS REVIEWED PREVIOUSLY:    No prior sleep testing.    ASSESSMENT    No flowsheet data found.  PROBLEM DESCRIPTION/ Sx on Presentation Interval Hx STATUS   ARTURO   + snoring, snoring arousals, no witnessed apnes   Working on getting used to CPAP uncontrolled   Sleepiness   + sleepiness when inactive   denies sleepiness when driving   ESS 11/24 on intake Still tired during the day persists   CPAP intolerance Trouble breathing with FFM   new new   Insomnia   Trouble falling and staying asleep  Prior hypnotics:      n/a  Current hypnotics: n/a   Continues waking frequently persists   Nocturia   x 2-3 per sleep period continues persists   Headaches   About twice per week upon awaken  About once per week has headache that starts during the day Continues-slightly less often persists   Other issues:  migraine, Vit D def, elevated BMI    PLAN     -continue will Full-face (discussed desensitization)  -discussed trying different levels of EPR  -will get download information   -consider trial of bipap if further trouble   -the patient is using and benefiting from PAP therapy when able to tolerate           The patient was given open opportunity to ask questions and/or express concerns about treatment plan.   All questions/concerns were discussed.     Two patient identifiers used prior to evaluation.

## 2022-08-30 NOTE — TELEPHONE ENCOUNTER
Staff contact patient on 8/30 to conf. their virtual appt. On 8/30 with Dr Zendejas staff left a voicemail.

## 2022-08-31 ENCOUNTER — OFFICE VISIT (OUTPATIENT)
Dept: SLEEP MEDICINE | Facility: CLINIC | Age: 47
End: 2022-08-31
Payer: MEDICAID

## 2022-08-31 DIAGNOSIS — R35.1 NOCTURIA: Primary | ICD-10-CM

## 2022-08-31 DIAGNOSIS — G47.33 OSA (OBSTRUCTIVE SLEEP APNEA): ICD-10-CM

## 2022-08-31 DIAGNOSIS — F51.09 OTHER INSOMNIA NOT DUE TO A SUBSTANCE OR KNOWN PHYSIOLOGICAL CONDITION: ICD-10-CM

## 2022-08-31 DIAGNOSIS — G47.10 HYPERSOMNOLENCE: ICD-10-CM

## 2022-08-31 PROCEDURE — 99214 OFFICE O/P EST MOD 30 MIN: CPT | Mod: 95,,, | Performed by: INTERNAL MEDICINE

## 2022-08-31 PROCEDURE — 99214 PR OFFICE/OUTPT VISIT, EST, LEVL IV, 30-39 MIN: ICD-10-PCS | Mod: 95,,, | Performed by: INTERNAL MEDICINE

## 2023-03-08 ENCOUNTER — CLINICAL SUPPORT (OUTPATIENT)
Dept: REHABILITATION | Facility: HOSPITAL | Age: 48
End: 2023-03-08
Payer: MEDICAID

## 2023-03-08 DIAGNOSIS — M75.01 ADHESIVE CAPSULITIS OF RIGHT SHOULDER: Primary | ICD-10-CM

## 2023-03-08 DIAGNOSIS — R53.1 WEAKNESS: ICD-10-CM

## 2023-03-08 DIAGNOSIS — M25.521 ELBOW PAIN, RIGHT: ICD-10-CM

## 2023-03-08 PROCEDURE — 97161 PT EVAL LOW COMPLEX 20 MIN: CPT

## 2023-03-08 PROCEDURE — 97110 THERAPEUTIC EXERCISES: CPT

## 2023-03-09 NOTE — PLAN OF CARE
[facility]  [place] [id number]  Physical Therapy Initial Evaluation    Date: 3/8/2023   Name: Marie Taylor  Clinic Number: [id number]    Therapy Diagnosis:   Encounter Diagnoses   Name Primary?    Adhesive capsulitis of right shoulder Yes    Weakness     Elbow pain, right      Physician: Reji Domínguez MD    Physician Orders: PT Eval and Treat   Medical Diagnosis from Referral: M75.01 (ICD-10-CM) - Adhesive capsulitis of right shoulder  Evaluation Date: 3/8/2023  Authorization Period Expiration: 12/31/2023  Plan of Care Expiration: 6/8/2023  Visit # / Visits authorized: 1/ 1    Time In: 10:02  Time Out: 11:00  Total Appointment Time (timed & untimed codes): 58 minutes    Precautions: Standard    Subjective   Date of onset: 2 weeks ago  History of current condition - Marie reports: insidious onset of left shoulder pain and limited movement. She went to the ER because pain was so intense. ER MD diagnosed her with adhesive capsulitis. She started moving it more after seeing the doctor. Comes in today still with complaints of shoulder pain, difficulty lifting her daughter, and limited left UE use.      Medical History:   Past Medical History:   Diagnosis Date    Allergy     Asthma     Chronic migraine without aura without status migrainosus, not intractable 2/29/2016    Mild intermittent asthma without complication 2/29/2016    Vitamin D deficiency disease 3/1/2016       Surgical History:   Marie Taylor  has a past surgical history that includes Tubal ligation and Endometrial ablation.    Medications:   Marie has a current medication list which includes the following prescription(s): albuterol, aspirin/acetaminophen/caffeine, bupropion, ergocalciferol, ondansetron, and rosuvastatin.    Allergies:   Review of patient's allergies indicates:  No Known Allergies       Prior Therapy: completed for left elbow and ankle surgeries  Social History:  lives with their family (has a 15 y.o.  daughter with special needs)  Occupation: works in [employer] - works from home  Prior Level of Function: able to use left arm without limitations or pain  Current Level of Function: limitations and pain when using left arm and when at rest; unable to lie on left side    Pain:  Current 4/10, worst 10/10, best 0/10   Location: { left shoulder(s)  Description: Variable  Aggravating Factors: Lifting and reaching and lying on left side  Easing Factors: rest and heat    Pts goals: use arm normally, eliminate pain, care for daughter without limitations    Objective     Passive Range of Motion:   Shoulder Right Left   Flexion 180 177   Abduction 180 145   ER at 45 90 90   ER at 90 90 90   IR 90 90      Active Range of Motion:   Shoulder Right Left   Flexion 180 150   Abduction 180 110   Functional ER T3 occiput   Functional IR L2 L5     Strength:  Shoulder Right Left   Flexion 5 4   Abduction 5 4   ER 5 4-   IR 5 4   Middle Trap 5 4-   Low Trap 5 3+       Special Tests:   Right Left   Empty Can Test negative weak   Drop Arm test negative negative   Neer's Test negative painful           TREATMENT   Treatment Time In: 10:20  Treatment Time Out: 11:00  Total Treatment time (time-based codes) separate from Evaluation: 40 minutes    Marie received therapeutic exercises to develop strength, endurance, ROM, flexibility, and posture for 30 minutes including:  Supine shoulder flexion AAROM x 20  Prone row 2x12  Prone extension 2x12  Prone T 2x12  Shoulder IR/ER arm by side orange band 2x15 each    Marie received the following manual therapy techniques: Joint mobilizations and PROM were applied to the: left shoulder for 10 minutes, including:  PROM shoulder flexion/IR/ER    Education provided:   - HEP, current condition, PT PLAN    Written Home Exercises Provided: yes.  Exercises were reviewed and patient was able to demonstrate them prior to the end of the session.  Patient demonstrated good  understanding of the  education provided.     See EMR under Patient Instructions for exercisesprovided 3/8/2023.    Assessment   Marie is a 47 y.o. female referred to outpatient Physical Therapy with a medical diagnosis of adhesive capsulitis. Pt presents with limited end range shoulder ROM, RC and periscapular weakness, and limited functional performance.    Pt prognosis is Excellent.   Pt will benefit from skilled outpatient Physical Therapy to address the deficits stated above and in the chart below, provide pt/family education, and to maximize pt's level of independence.     Plan of care discussed with patient: Yes  Pt's spiritual, cultural and educational needs considered and patient is agreeable to the plan of care and goals as stated below:     Anticipated Barriers for therapy: none    Medical Necessity is demonstrated by the following  History  Co-morbidities and personal factors that may impact the plan of care Co-morbidities:   none    Personal Factors:   no deficits     low   Examination  Body Structures and Functions, activity limitations and participation restrictions that may impact the plan of care Body Regions:   upper extremities    Body Systems:    ROM  strength  motor control    Participation Restrictions:   covid-19    Activity limitations:   Learning and applying knowledge  no deficits    General Tasks and Commands  no deficits    Communication  no deficits    Mobility  lifting and carrying objects  fine hand use (grasping/picking up)    Self care  washing oneself (bathing, drying, washing hands)  dressing    Domestic Life  no deficits    Interactions/Relationships  no deficits    Life Areas  no deficits    Community and Social Life  no deficits         low   Clinical Presentation stable and uncomplicated low   Decision Making/ Complexity Score: low     Goals:  Short term goals: 2 weeks  Patient will become independent in HEP for maximal gains made in PT.  Patient to improve AROM flexion to 160 for improvements in  reaching activities.      Long term goals: 12 weeks  Patient to demo 180 degrees of AROM flexion for improved overhead tasks.  Patient will improve strength to 5/5 for improved performance of household duties.  Patient will lift and carry 65 lb for safe transfer of daughter.    Plan   Plan of care Certification: 3/8/2023 to 6/8/2023.    Outpatient Physical Therapy 1 times weekly for 3 months to include the following interventions: Manual Therapy, Moist Heat/ Ice, Patient Education, and Therapeutic Exercise.     Sudha Mccracken, PT, DPT, OCS

## 2023-04-05 ENCOUNTER — CLINICAL SUPPORT (OUTPATIENT)
Dept: REHABILITATION | Facility: HOSPITAL | Age: 48
End: 2023-04-05
Payer: MEDICAID

## 2023-04-05 DIAGNOSIS — M75.01 ADHESIVE CAPSULITIS OF RIGHT SHOULDER: Primary | ICD-10-CM

## 2023-04-05 DIAGNOSIS — R53.1 WEAKNESS: ICD-10-CM

## 2023-04-05 PROCEDURE — 97110 THERAPEUTIC EXERCISES: CPT

## 2023-04-10 NOTE — PROGRESS NOTES
Physical Therapy Daily Treatment Note     Name: Marie Taylor  Clinic Number: [id number]    Therapy Diagnosis:   Encounter Diagnoses   Name Primary?    Adhesive capsulitis of right shoulder Yes    Weakness      Physician: Reji Domínguez MD    Visit Date: 4/5/2023  Physician Orders: PT Eval and Treat   Medical Diagnosis from Referral: M75.01 (ICD-10-CM) - Adhesive capsulitis of right shoulder  Evaluation Date: 3/8/2023  Authorization Period Expiration: 12/31/2023  Plan of Care Expiration: 6/8/2023  Visit # / Visits authorized: 1/ 20     Time In: 15:06  Time Out: 16:00  Total Appointment Time (timed & untimed codes): 54 minutes     Precautions: Standard    Subjective     Pt reports: her shoulder is better but not all the way. Still having some pain and still not using her left arm normally.  She was compliant with home exercise program.  Response to previous treatment: soreness  Functional change: improved overhead movement    Pain: 2/10  Location: left shoulder      Objective     Marie received therapeutic exercises to develop strength, endurance, ROM, flexibility, and posture for 37 minutes including:  UBE 3 min forward/ 3 min backwards level 2.0  Supine shoulder flexion AAROM x 20  Prone row 2x12  Prone extension 2x12  Prone T 2x12  Shoulder IR/ER arm by side orange band 2x15 each  Quad rocking for shoulder flexion 3x15     Marie received the following manual therapy techniques: Joint mobilizations and PROM were applied to the: left shoulder for 8 minutes, including:  PROM shoulder flexion/IR/ER    Dry needling: Consent was obtained prior to treatment. Patient was assessed in sidelying . 8 2 inch monofilament needles were inserted into left deltoid, subacromial space. Needles were left in for 7 minutes. Patient demonstrated local erythema and reduced pain post-treatment. Patient remains a good candidate for dry needling.     Education provided:   - HEP, current condition, PT PLAN     Written Home  Exercises Provided: yes.  Exercises were reviewed and patient was able to demonstrate them prior to the end of the session.  Patient demonstrated good  understanding of the education provided.      See EMR under Patient Instructions for exercisesprovided 3/8/2023.    Assessment     Patient cont to have limited end range flexion with empty end feel. In addition, combined movement of the shoulder (reaching behind back) is difficult and painful.  Marie Is progressing well towards her goals.   Pt prognosis is Good.     Pt will continue to benefit from skilled outpatient physical therapy to address the deficits listed in the problem list box on initial evaluation, provide pt/family education and to maximize pt's level of independence in the home and community environment.     Pt's spiritual, cultural and educational needs considered and pt agreeable to plan of care and goals.    Anticipated barriers to physical therapy: none    Goals:   Short term goals: 2 weeks  Patient will become independent in HEP for maximal gains made in PT.  Patient to improve AROM flexion to 160 for improvements in reaching activities.        Long term goals: 12 weeks  Patient to demo 180 degrees of AROM flexion for improved overhead tasks.  Patient will improve strength to 5/5 for improved performance of household duties.  Patient will lift and carry 65 lb for safe transfer of daughter.    Plan     Focus on obtaining full ROM and maximize scapular and RC strength.    Sudha Mccracken, PT , DPT, OCS

## 2023-04-13 ENCOUNTER — CLINICAL SUPPORT (OUTPATIENT)
Dept: REHABILITATION | Facility: HOSPITAL | Age: 48
End: 2023-04-13
Payer: MEDICAID

## 2023-04-13 DIAGNOSIS — M25.521 ELBOW PAIN, RIGHT: ICD-10-CM

## 2023-04-13 DIAGNOSIS — M75.01 ADHESIVE CAPSULITIS OF RIGHT SHOULDER: ICD-10-CM

## 2023-04-13 DIAGNOSIS — R53.1 WEAKNESS: Primary | ICD-10-CM

## 2023-04-13 PROCEDURE — 97110 THERAPEUTIC EXERCISES: CPT

## 2023-04-19 ENCOUNTER — CLINICAL SUPPORT (OUTPATIENT)
Dept: REHABILITATION | Facility: HOSPITAL | Age: 48
End: 2023-04-19
Payer: MEDICAID

## 2023-04-19 DIAGNOSIS — M75.01 ADHESIVE CAPSULITIS OF RIGHT SHOULDER: ICD-10-CM

## 2023-04-19 DIAGNOSIS — M25.521 ELBOW PAIN, RIGHT: ICD-10-CM

## 2023-04-19 DIAGNOSIS — R53.1 WEAKNESS: Primary | ICD-10-CM

## 2023-04-19 PROCEDURE — 97110 THERAPEUTIC EXERCISES: CPT

## 2023-04-19 NOTE — PROGRESS NOTES
Physical Therapy Daily Treatment Note     Name: Marie Taylor  Clinic Number: [id number]    Therapy Diagnosis:   No diagnosis found.    Physician: Reji Domínguez MD    Visit Date: 4/19/2023  Physician Orders: PT Eval and Treat   Medical Diagnosis from Referral: M75.01 (ICD-10-CM) - Adhesive capsulitis of right shoulder  Evaluation Date: 3/8/2023  Authorization Period Expiration: 12/31/2023  Plan of Care Expiration: 6/8/2023  Visit # / Visits authorized: 3 / 20     Time In: 11:00  Time Out: 11:50  Total Appointment Time (timed & untimed codes): 50 minutes     Precautions: Standard    Subjective     Pt reports: she is feeling better.  She was compliant with home exercise program.  Response to previous treatment: soreness  Functional change: improved overhead movement    Pain: 2/10  Location: left shoulder      Objective     Pre-session PROM flexion 165  Post-session PROM flexion 175      Marie received therapeutic exercises to develop strength, endurance, ROM, flexibility, and posture for 40 minutes including:  Prone high row 2x15 1#  Prone Y 2x15 0#  Prone T 2x15 1#  Quad rocking for shoulder flexion 3x15  Standing PNF D2 3x10  Standing shoulder flexion (full range) red band 3x10  Brugers red band 3x10  Wall slide + scap punch 2x15     Marie received the following manual therapy techniques: Joint mobilizations and PROM were applied to the: left shoulder for 10 minutes, including:  PROM shoulder flexion/IR/ER       Education provided:   - HEP, current condition, PT PLAN     Written Home Exercises Provided: yes.  Exercises were reviewed and patient was able to demonstrate them prior to the end of the session.  Patient demonstrated good  understanding of the education provided.      See EMR under Patient Instructions for exercisesprovided 3/8/2023.    Assessment     Much improved ROM at start of session as compared to last session. Patient was able to tolerate more overhead work but with complains of  fatigue.    Marie Is progressing well towards her goals.   Pt prognosis is Good.     Pt will continue to benefit from skilled outpatient physical therapy to address the deficits listed in the problem list box on initial evaluation, provide pt/family education and to maximize pt's level of independence in the home and community environment.     Pt's spiritual, cultural and educational needs considered and pt agreeable to plan of care and goals.    Anticipated barriers to physical therapy: none    Goals:   Short term goals: 2 weeks  Patient will become independent in HEP for maximal gains made in PT.  Patient to improve AROM flexion to 160 for improvements in reaching activities.        Long term goals: 12 weeks  Patient to demo 180 degrees of AROM flexion for improved overhead tasks.  Patient will improve strength to 5/5 for improved performance of household duties.  Patient will lift and carry 65 lb for safe transfer of daughter.    Plan     Focus on obtaining full ROM and maximize scapular and RC strength.    Sudha Mccracken, PT , DPT, OCS

## 2023-04-27 ENCOUNTER — CLINICAL SUPPORT (OUTPATIENT)
Dept: REHABILITATION | Facility: HOSPITAL | Age: 48
End: 2023-04-27
Payer: MEDICAID

## 2023-04-27 DIAGNOSIS — M75.01 ADHESIVE CAPSULITIS OF RIGHT SHOULDER: ICD-10-CM

## 2023-04-27 DIAGNOSIS — M25.521 ELBOW PAIN, RIGHT: ICD-10-CM

## 2023-04-27 DIAGNOSIS — R53.1 WEAKNESS: Primary | ICD-10-CM

## 2023-04-27 PROCEDURE — 97110 THERAPEUTIC EXERCISES: CPT

## 2023-04-27 NOTE — PROGRESS NOTES
Physical Therapy Daily Treatment Note     Name: Marie Taylor  Clinic Number: [id number]    Therapy Diagnosis:   Encounter Diagnoses   Name Primary?    Weakness Yes    Adhesive capsulitis of right shoulder     Elbow pain, right        Physician: Reji Domínguez MD    Visit Date: 4/27/2023  Physician Orders: PT Eval and Treat   Medical Diagnosis from Referral: M75.01 (ICD-10-CM) - Adhesive capsulitis of right shoulder  Evaluation Date: 3/8/2023  Authorization Period Expiration: 12/31/2023  Plan of Care Expiration: 6/8/2023  Visit # / Visits authorized: 4 / 20     Time In: 11:04  Time Out: 12:00  Total Appointment Time (timed & untimed codes): 56 minutes     Precautions: Standard    Subjective     Pt reports: shoulder is feeling pretty good. Still weak when she tries to transfer her daughter.  She was compliant with home exercise program.  Response to previous treatment: soreness  Functional change: improved overhead movement    Pain: 2/10  Location: left shoulder      Objective     Pre-session PROM flexion 170  Post-session PROM flexion 175      Marie received therapeutic exercises to develop strength, endurance, ROM, flexibility, and posture for 42 minutes including:  Prone high row 2x15 2#  Prone Y 2x15 0#  Prone T 2x15 1#  Quad rocking for shoulder flexion 3x15  Supine PNF D2 3x10  Standing shoulder flexion to 100 degrees 3# 3x12  Brugers green band 3x10  Farmer's carry 15# 5 x 100 ft     Marie received the following manual therapy techniques: Joint mobilizations and PROM were applied to the: left shoulder for 10 minutes, including:  PROM shoulder flexion/IR/ER       Education provided:   - HEP, current condition, PT PLAN     Written Home Exercises Provided: yes.  Exercises were reviewed and patient was able to demonstrate them prior to the end of the session.  Patient demonstrated good  understanding of the education provided.      See EMR under Patient Instructions for exercisesprovided  3/8/2023.    Assessment     Continues to improve in PT with near normal ROM into flexion. Very challenged with abad carries. Will progress to functional strengthening as tolerated.     Marie Is progressing well towards her goals.   Pt prognosis is Good.     Pt will continue to benefit from skilled outpatient physical therapy to address the deficits listed in the problem list box on initial evaluation, provide pt/family education and to maximize pt's level of independence in the home and community environment.     Pt's spiritual, cultural and educational needs considered and pt agreeable to plan of care and goals.    Anticipated barriers to physical therapy: none    Goals:   Short term goals: 2 weeks  Patient will become independent in HEP for maximal gains made in PT.  Patient to improve AROM flexion to 160 for improvements in reaching activities.        Long term goals: 12 weeks  Patient to demo 180 degrees of AROM flexion for improved overhead tasks.  Patient will improve strength to 5/5 for improved performance of household duties.  Patient will lift and carry 65 lb for safe transfer of daughter.    Plan     Focus on obtaining full ROM and maximize scapular and RC strength.    Sudha Mccracken, PT , DPT, OCS

## 2023-05-04 ENCOUNTER — CLINICAL SUPPORT (OUTPATIENT)
Dept: REHABILITATION | Facility: HOSPITAL | Age: 48
End: 2023-05-04
Payer: MEDICAID

## 2023-05-04 DIAGNOSIS — M75.01 ADHESIVE CAPSULITIS OF RIGHT SHOULDER: ICD-10-CM

## 2023-05-04 DIAGNOSIS — R53.1 WEAKNESS: Primary | ICD-10-CM

## 2023-05-04 PROCEDURE — 97110 THERAPEUTIC EXERCISES: CPT

## 2023-05-11 ENCOUNTER — CLINICAL SUPPORT (OUTPATIENT)
Dept: REHABILITATION | Facility: HOSPITAL | Age: 48
End: 2023-05-11
Payer: MEDICAID

## 2023-05-11 DIAGNOSIS — M75.01 ADHESIVE CAPSULITIS OF RIGHT SHOULDER: ICD-10-CM

## 2023-05-11 DIAGNOSIS — R53.1 WEAKNESS: Primary | ICD-10-CM

## 2023-05-11 PROCEDURE — 97110 THERAPEUTIC EXERCISES: CPT

## 2023-05-11 NOTE — PROGRESS NOTES
Physical Therapy Daily Treatment Note     Name: Marie Taylor  Clinic Number: [id number]    Therapy Diagnosis:   Encounter Diagnoses   Name Primary?    Weakness Yes    Adhesive capsulitis of right shoulder      Physician: Reji Domínguez MD    Visit Date: 5/11/2023  Physician Orders: PT Eval and Treat   Medical Diagnosis from Referral: M75.01 (ICD-10-CM) - Adhesive capsulitis of right shoulder  Evaluation Date: 3/8/2023  Authorization Period Expiration: 12/31/2023  Plan of Care Expiration: 6/8/2023  Visit # / Visits authorized: 6 / 20     Time In: 13:10  Time Out: 14:00  Total Appointment Time (timed & untimed codes): 50 minutes     Precautions: Standard    Subjective     Pt reports: she fell in the shower today, and reports soreness in her left shoulder.  She was compliant with home exercise program.  Response to previous treatment: soreness  Functional change: improved overhead movement    Pain: 4/10  Location: left shoulder      Objective     Pre-session PROM flexion 150  Post-session PROM flexion 170      Marie received therapeutic exercises to develop strength, endurance, ROM, flexibility, and posture for 30 minutes including:  Ball rolls @ table x 10  Ball rolls flexion + scaption @ wall x 15 each  Supine scap protract 0# 3x12  Prone extension 3x12 0#  Seated B ER with scap retract 2x15    Marie received the following manual therapy techniques: Joint mobilizations and PROM were applied to the: left shoulder for 10 minutes, including:  PROM shoulder flexion/IR/ER  STM to left bicep + anterior deltoid       Education provided:   - HEP, current condition, PT PLAN     Written Home Exercises Provided: yes.  Exercises were reviewed and patient was able to demonstrate them prior to the end of the session.  Patient demonstrated good  understanding of the education provided.      See EMR under Patient Instructions for exercisesprovided 3/8/2023.    Assessment     Limited ROM at start of session due to  falling in the shower. Noted increased muscle tone in bicep today. Able to improved with manual therapy and exercise.     Marie Is progressing well towards her goals.   Pt prognosis is Good.     Pt will continue to benefit from skilled outpatient physical therapy to address the deficits listed in the problem list box on initial evaluation, provide pt/family education and to maximize pt's level of independence in the home and community environment.     Pt's spiritual, cultural and educational needs considered and pt agreeable to plan of care and goals.    Anticipated barriers to physical therapy: none    Goals:   Short term goals: 2 weeks  Patient will become independent in HEP for maximal gains made in PT.  Patient to improve AROM flexion to 160 for improvements in reaching activities.        Long term goals: 12 weeks  Patient to demo 180 degrees of AROM flexion for improved overhead tasks.  Patient will improve strength to 5/5 for improved performance of household duties.  Patient will lift and carry 65 lb for safe transfer of daughter.    Plan     Focus on obtaining full ROM and maximize scapular and RC strength.    Sudha Mccracken, PT , DPT, OCS

## 2023-05-11 NOTE — PROGRESS NOTES
Physical Therapy Daily Treatment Note     Name: Marie Taylor  Clinic Number: [id number]    Therapy Diagnosis:   Encounter Diagnoses   Name Primary?    Weakness Yes    Adhesive capsulitis of right shoulder      Physician: Reji Domínguez MD    Visit Date: 5/4/2023  Physician Orders: PT Eval and Treat   Medical Diagnosis from Referral: M75.01 (ICD-10-CM) - Adhesive capsulitis of right shoulder  Evaluation Date: 3/8/2023  Authorization Period Expiration: 12/31/2023  Plan of Care Expiration: 6/8/2023  Visit # / Visits authorized: 5 / 20     Time In: 13:10  Time Out: 13:50  Total Appointment Time (timed & untimed codes): 40 minutes     Precautions: Standard    Subjective     Pt reports: she is feeling stiff today  She was compliant with home exercise program.  Response to previous treatment: soreness  Functional change: improved overhead movement    Pain: 2/10  Location: left shoulder      Objective     Pre-session PROM flexion 165  Post-session PROM flexion 170      Marie received therapeutic exercises to develop strength, endurance, ROM, flexibility, and posture for 30 minutes including:  Prone high row 2x15 2#  Prone Y 2x15 0#  Prone T 2x15 1#  Quad rocking for shoulder flexion 3x15 - not today  Supine PNF D2 3x10  Standing shoulder flexion to 100 degrees 3# 3x12 - not today  Brugers green band 3x10  Farmer's carry 15# 5 x 100 ft - not today     Marie received the following manual therapy techniques: Joint mobilizations and PROM were applied to the: left shoulder for 10 minutes, including:  PROM shoulder flexion/IR/ER       Education provided:   - HEP, current condition, PT PLAN     Written Home Exercises Provided: yes.  Exercises were reviewed and patient was able to demonstrate them prior to the end of the session.  Patient demonstrated good  understanding of the education provided.      See EMR under Patient Instructions for exercisesprovided 3/8/2023.    Assessment     Slight loss in shoulder flexion  ROM seen today; however patient has maintained shoulder IR/ER ROM. Modified session today due to patient's stiffness and feeling tired.     Marie Is progressing well towards her goals.   Pt prognosis is Good.     Pt will continue to benefit from skilled outpatient physical therapy to address the deficits listed in the problem list box on initial evaluation, provide pt/family education and to maximize pt's level of independence in the home and community environment.     Pt's spiritual, cultural and educational needs considered and pt agreeable to plan of care and goals.    Anticipated barriers to physical therapy: none    Goals:   Short term goals: 2 weeks  Patient will become independent in HEP for maximal gains made in PT.  Patient to improve AROM flexion to 160 for improvements in reaching activities.        Long term goals: 12 weeks  Patient to demo 180 degrees of AROM flexion for improved overhead tasks.  Patient will improve strength to 5/5 for improved performance of household duties.  Patient will lift and carry 65 lb for safe transfer of daughter.    Plan     Focus on obtaining full ROM and maximize scapular and RC strength.    Sudha Mccracken, PT , DPT, OCS

## 2023-05-25 ENCOUNTER — CLINICAL SUPPORT (OUTPATIENT)
Dept: REHABILITATION | Facility: HOSPITAL | Age: 48
End: 2023-05-25
Payer: MEDICAID

## 2023-05-25 DIAGNOSIS — M75.01 ADHESIVE CAPSULITIS OF RIGHT SHOULDER: ICD-10-CM

## 2023-05-25 DIAGNOSIS — R53.1 WEAKNESS: Primary | ICD-10-CM

## 2023-05-25 PROCEDURE — 97110 THERAPEUTIC EXERCISES: CPT

## 2023-05-25 NOTE — PROGRESS NOTES
Physical Therapy Daily Treatment Note     Name: Marie Taylor  Clinic Number: [id number]    Therapy Diagnosis:   No diagnosis found.    Physician: Reji Domínguez MD    Visit Date: 5/25/2023  Physician Orders: PT Eval and Treat   Medical Diagnosis from Referral: M75.01 (ICD-10-CM) - Adhesive capsulitis of right shoulder  Evaluation Date: 3/8/2023  Authorization Period Expiration: 12/31/2023  Plan of Care Expiration: 6/8/2023  Visit # / Visits authorized: 7 / 20     Time In: 10:07  Time Out: 11:00  Total Appointment Time (timed & untimed codes): 53 minutes     Precautions: Standard    Subjective     Pt reports: she is feeling better.   She was compliant with home exercise program.  Response to previous treatment: soreness  Functional change: improved overhead movement    Pain: 2/10  Location: left shoulder      Objective     Pre-session PROM flexion 160  Post-session PROM flexion 170      Marie received therapeutic exercises to develop strength, endurance, ROM, flexibility, and posture for 45 minutes including:  UBE level 2 3 min forward 3 min backward  Ball rolls @ wall with step through x 20  Standing B ER with scap retract 2x15 green band  Shoulder flexion with red theraband (through full range) 3x10  Snow angels light orange band 4x5    Marie received the following manual therapy techniques: Joint mobilizations and PROM were applied to the: left shoulder for 10 minutes, including:  PROM shoulder flexion/IR/ER  Inferior GHJ mobilizations grade III-IV       Education provided:   - HEP, current condition, PT PLAN     Written Home Exercises Provided: yes.  Exercises were reviewed and patient was able to demonstrate them prior to the end of the session.  Patient demonstrated good  understanding of the education provided.      See EMR under Patient Instructions for exercisesprovided 3/8/2023.    Assessment     Improved ROM noted today;however, cont to have limited end range flexion. Able to have patient  perform more strengthening exercises today.     Marie Is progressing well towards her goals.   Pt prognosis is Good.     Pt will continue to benefit from skilled outpatient physical therapy to address the deficits listed in the problem list box on initial evaluation, provide pt/family education and to maximize pt's level of independence in the home and community environment.     Pt's spiritual, cultural and educational needs considered and pt agreeable to plan of care and goals.    Anticipated barriers to physical therapy: none    Goals:   Short term goals: 2 weeks  Patient will become independent in HEP for maximal gains made in PT.  Patient to improve AROM flexion to 160 for improvements in reaching activities.        Long term goals: 12 weeks  Patient to demo 180 degrees of AROM flexion for improved overhead tasks.  Patient will improve strength to 5/5 for improved performance of household duties.  Patient will lift and carry 65 lb for safe transfer of daughter.    Plan     Focus on obtaining full ROM and maximize scapular and RC strength.    Sudha Mccracken, PT , DPT, OCS

## 2023-05-30 ENCOUNTER — CLINICAL SUPPORT (OUTPATIENT)
Dept: REHABILITATION | Facility: HOSPITAL | Age: 48
End: 2023-05-30
Payer: MEDICAID

## 2023-05-30 DIAGNOSIS — M75.01 ADHESIVE CAPSULITIS OF RIGHT SHOULDER: ICD-10-CM

## 2023-05-30 DIAGNOSIS — R53.1 WEAKNESS: Primary | ICD-10-CM

## 2023-05-30 PROCEDURE — 97110 THERAPEUTIC EXERCISES: CPT

## 2023-05-30 NOTE — PROGRESS NOTES
Physical Therapy Daily Treatment Note     Name: Marie Taylor  Clinic Number: [id number]    Therapy Diagnosis:   Encounter Diagnoses   Name Primary?    Weakness Yes    Adhesive capsulitis of right shoulder        Physician: Reji Domínguez MD    Visit Date: 5/30/2023  Physician Orders: PT Eval and Treat   Medical Diagnosis from Referral: M75.01 (ICD-10-CM) - Adhesive capsulitis of right shoulder  Evaluation Date: 3/8/2023  Authorization Period Expiration: 12/31/2023  Plan of Care Expiration: 6/8/2023  Visit # / Visits authorized: 8 / 20     Time In: 14:00  Time Out: 15:00  Total Appointment Time (timed & untimed codes): 60 minutes     Precautions: Standard    Subjective     Pt reports: her shoulder is good, still not normal.  She was compliant with home exercise program.  Response to previous treatment: soreness  Functional change: improved overhead movement    Pain: 2/10  Location: left shoulder      Objective     Pre-session PROM flexion 165  Post-session PROM flexion 175      Marie received therapeutic exercises to develop strength, endurance, ROM, flexibility, and posture for 53 minutes including:  UBE level 2 3 min forward 3 min backward (handles up high)  Supine shoulder flexion with overpressure 3# 2x15  Seated assisted shoulder flexion 7# 4x8  Pull downs with scap retract orange 3x12  IR/ER walkouts @ 90 flexion 3x10 orange band  Prone row 3# 3x12-15  Prone extension 3x12-15  Prone T 3x12-15  Prone Y 3x12-15    Marie received the following manual therapy techniques: Joint mobilizations and PROM were applied to the: left shoulder for 6 minutes, including:  PROM shoulder flexion/IR/ER  Inferior GHJ mobilizations grade III-IV       Education provided:   - HEP, current condition, PT PLAN     Written Home Exercises Provided: yes.  Exercises were reviewed and patient was able to demonstrate them prior to the end of the session.  Patient demonstrated good  understanding of the education provided.       See EMR under Patient Instructions for exercisesprovided 3/8/2023.    Assessment     Better flexion noted today. Also, able to perform strengthening with less complaints.    Marie Is progressing well towards her goals.   Pt prognosis is Good.     Pt will continue to benefit from skilled outpatient physical therapy to address the deficits listed in the problem list box on initial evaluation, provide pt/family education and to maximize pt's level of independence in the home and community environment.     Pt's spiritual, cultural and educational needs considered and pt agreeable to plan of care and goals.    Anticipated barriers to physical therapy: none    Goals:   Short term goals: 2 weeks  Patient will become independent in HEP for maximal gains made in PT.  Patient to improve AROM flexion to 160 for improvements in reaching activities.        Long term goals: 12 weeks  Patient to demo 180 degrees of AROM flexion for improved overhead tasks.  Patient will improve strength to 5/5 for improved performance of household duties.  Patient will lift and carry 65 lb for safe transfer of daughter.    Plan     Focus on obtaining full ROM and maximize scapular and RC strength.    Sudha Mccracken, PT , DPT, OCS

## 2023-06-01 ENCOUNTER — CLINICAL SUPPORT (OUTPATIENT)
Dept: REHABILITATION | Facility: HOSPITAL | Age: 48
End: 2023-06-01
Payer: MEDICAID

## 2023-06-01 DIAGNOSIS — R53.1 WEAKNESS: Primary | ICD-10-CM

## 2023-06-01 DIAGNOSIS — M75.01 ADHESIVE CAPSULITIS OF RIGHT SHOULDER: ICD-10-CM

## 2023-06-01 PROCEDURE — 97110 THERAPEUTIC EXERCISES: CPT

## 2023-06-01 NOTE — PROGRESS NOTES
Physical Therapy Daily Treatment Note     Name: Marie Taylor  Clinic Number: [id number]    Therapy Diagnosis:   Encounter Diagnoses   Name Primary?    Weakness Yes    Adhesive capsulitis of right shoulder        Physician: Reji Domínguez MD    Visit Date: 6/1/2023  Physician Orders: PT Eval and Treat   Medical Diagnosis from Referral: M75.01 (ICD-10-CM) - Adhesive capsulitis of right shoulder  Evaluation Date: 3/8/2023  Authorization Period Expiration: 12/31/2023  Plan of Care Expiration: 6/8/2023  Visit # / Visits authorized: 9 / 20     Time In: 13:10  Time Out: 14:00  Total Appointment Time (timed & untimed codes): 50 minutes     Precautions: Standard    Subjective     Pt reports:she's doing better. Still some pain with overhead movements.   She was compliant with home exercise program.  Response to previous treatment: soreness  Functional change: improved overhead movement    Pain: 2/10  Location: left shoulder      Objective     Pre-session PROM flexion 165  Post-session PROM flexion 175      Marie received therapeutic exercises to develop strength, endurance, ROM, flexibility, and posture for 44 minutes including:  UBE level 2 3 min forward 3 min backward (handles up high)  Supine shoulder flexion with overpressure 3# 2x15  Seated assisted shoulder flexion 7# 4x8  Pull downs with scap retract orange 3x12  IR/ER walkouts @ 90 flexion 3x10 orange band - not today  Prone row 3# 3x12-15  Prone extension 3x12-15 3#  Prone T 3x12-15 2#  Prone Y 3x12-15 2#    Marie received the following manual therapy techniques: Joint mobilizations and PROM were applied to the: left shoulder for 6 minutes, including:  PROM shoulder flexion/IR/ER  Inferior GHJ mobilizations grade III-IV       Education provided:   - HEP, current condition, PT PLAN     Written Home Exercises Provided: yes.  Exercises were reviewed and patient was able to demonstrate them prior to the end of the session.  Patient demonstrated good   understanding of the education provided.      See EMR under Patient Instructions for exercisesprovided 3/8/2023.    Assessment     Progressing nicely with improved scapular control in prone. Cont to lack end range shoulder elevation. Improved with manual therapy.     Marie Is progressing well towards her goals.   Pt prognosis is Good.     Pt will continue to benefit from skilled outpatient physical therapy to address the deficits listed in the problem list box on initial evaluation, provide pt/family education and to maximize pt's level of independence in the home and community environment.     Pt's spiritual, cultural and educational needs considered and pt agreeable to plan of care and goals.    Anticipated barriers to physical therapy: none    Goals:   Short term goals: 2 weeks  Patient will become independent in HEP for maximal gains made in PT.  Patient to improve AROM flexion to 160 for improvements in reaching activities.        Long term goals: 12 weeks  Patient to demo 180 degrees of AROM flexion for improved overhead tasks.  Patient will improve strength to 5/5 for improved performance of household duties.  Patient will lift and carry 65 lb for safe transfer of daughter.    Plan     Focus on obtaining full ROM and maximize scapular and RC strength.    Sudha Mccracken, PT , DPT, OCS

## 2023-06-15 ENCOUNTER — CLINICAL SUPPORT (OUTPATIENT)
Dept: REHABILITATION | Facility: HOSPITAL | Age: 48
End: 2023-06-15
Payer: MEDICAID

## 2023-06-15 DIAGNOSIS — R53.1 WEAKNESS: Primary | ICD-10-CM

## 2023-06-15 DIAGNOSIS — M75.01 ADHESIVE CAPSULITIS OF RIGHT SHOULDER: ICD-10-CM

## 2023-06-15 PROCEDURE — 97110 THERAPEUTIC EXERCISES: CPT

## 2023-06-15 NOTE — PROGRESS NOTES
Physical Therapy Daily Treatment Note     Name: Marie Taylor  Clinic Number: [id number]    Therapy Diagnosis:   Encounter Diagnoses   Name Primary?    Weakness Yes    Adhesive capsulitis of right shoulder        Physician: Reji Domínguez MD    Visit Date: 6/15/2023  Physician Orders: PT Eval and Treat   Medical Diagnosis from Referral: M75.01 (ICD-10-CM) - Adhesive capsulitis of right shoulder  Evaluation Date: 3/8/2023  Authorization Period Expiration: 12/31/2023  Plan of Care Expiration: 6/8/2023  Visit # / Visits authorized: 10 / 20     Time In: 13:05  Time Out: 14:00  Total Appointment Time (timed & untimed codes): 55 minutes     Precautions: Standard    Subjective     Pt reports:she's doing better. Still some pain with overhead movements.   She was compliant with home exercise program.  Response to previous treatment: soreness  Functional change: improved overhead movement    Pain: 2/10  Location: left shoulder      Objective     Pre-session PROM flexion 165  Post-session PROM flexion 175      Marie received therapeutic exercises to develop strength, endurance, ROM, flexibility, and posture for 53 minutes including:  UBE level 2.5 3 min forward 3 min backward (handles up high)  Supine shoulder flexion with overpressure 3# 2x15  Foam roller wall slide with yellow band at wrists 2x15  Robots @ wall 4x10 yellow band  Sidelying ER 3x12 2#  Prone high row 3# 3x12-15  Prone T 3x12-15 2#  Prone Y 3x12-15 2#    Marie received the following manual therapy techniques: Joint mobilizations and PROM were applied to the: left shoulder for 2 minutes, including:  PROM shoulder flexion/IR/ER  Inferior GHJ mobilizations grade III-IV       Education provided:   - HEP, current condition, PT PLAN     Written Home Exercises Provided: yes.  Exercises were reviewed and patient was able to demonstrate them prior to the end of the session.  Patient demonstrated good  understanding of the education provided.      See EMR  under Patient Instructions for exercisesprovided 3/8/2023.    Assessment     Increased pain and stiffness with shoulder elevation. This improved some during session. Advised patient to work on overhead motion daily.    Marie Is progressing well towards her goals.   Pt prognosis is Good.     Pt will continue to benefit from skilled outpatient physical therapy to address the deficits listed in the problem list box on initial evaluation, provide pt/family education and to maximize pt's level of independence in the home and community environment.     Pt's spiritual, cultural and educational needs considered and pt agreeable to plan of care and goals.    Anticipated barriers to physical therapy: none    Goals:   Short term goals: 2 weeks  Patient will become independent in HEP for maximal gains made in PT.  Patient to improve AROM flexion to 160 for improvements in reaching activities.        Long term goals: 12 weeks  Patient to demo 180 degrees of AROM flexion for improved overhead tasks.  Patient will improve strength to 5/5 for improved performance of household duties.  Patient will lift and carry 65 lb for safe transfer of daughter.    Plan     Focus on obtaining full ROM and maximize scapular and RC strength.    Sudha Mccracken, PT , DPT, OCS

## 2023-06-28 ENCOUNTER — CLINICAL SUPPORT (OUTPATIENT)
Dept: REHABILITATION | Facility: HOSPITAL | Age: 48
End: 2023-06-28
Payer: MEDICAID

## 2023-06-28 DIAGNOSIS — M75.01 ADHESIVE CAPSULITIS OF RIGHT SHOULDER: ICD-10-CM

## 2023-06-28 DIAGNOSIS — R53.1 WEAKNESS: Primary | ICD-10-CM

## 2023-06-28 PROCEDURE — 97110 THERAPEUTIC EXERCISES: CPT

## 2023-06-28 NOTE — PROGRESS NOTES
"  Physical Therapy Daily Treatment Note     Name: Marie Taylor  Clinic Number: [id number]    Therapy Diagnosis:   Encounter Diagnoses   Name Primary?    Weakness Yes    Adhesive capsulitis of right shoulder        Physician: Reji Domínguez MD    Visit Date: 6/28/2023  Physician Orders: PT Eval and Treat   Medical Diagnosis from Referral: M75.01 (ICD-10-CM) - Adhesive capsulitis of right shoulder  Evaluation Date: 3/8/2023  Authorization Period Expiration: 12/31/2023  Plan of Care Expiration: 6/8/2023  Visit # / Visits authorized: 11 / 20     Time In: 10:53  Time Out: 11:43  Total Appointment Time (timed & untimed codes): 50 minutes     Precautions: Standard    Subjective     Pt reports: she feels like her shoulder is stiffening up again.  She was compliant with home exercise program.  Response to previous treatment: mild soreness  Functional change: improved overhead movement    Pain: 2/10  Location: left shoulder      Objective     Pre-session PROM flexion 165  Post-session PROM flexion 175      Marie received therapeutic exercises to develop strength, endurance, ROM, flexibility, and posture for 48 minutes including:  UBE level 2.5 3 min forward 3 min backward (handles up high)  Supine shoulder flexion with overpressure with stick 10 x 10" holds  Wall slide with step through for shoulder flexion x 30  IR/ER @ 90 flexion green/orange band 3x10 each  Foam roller wall slide with yellow band at wrists 2x15 - not today  Robots @ wall 4x10 yellow band - not today  Sidelying ER 3x12 2#  Prone high row 3# 3x12-15  Prone T 3x12-15 0#      Marie received the following manual therapy techniques: Joint mobilizations and PROM were applied to the: left shoulder for 2 minutes, including:  PROM shoulder flexion/IR/ER  Inferior GHJ mobilizations grade III-IV       Education provided:   - HEP, current condition, PT PLAN     Written Home Exercises Provided: yes.  Exercises were reviewed and patient was able to " demonstrate them prior to the end of the session.  Patient demonstrated good  understanding of the education provided.      See EMR under Patient Instructions for exercisesprovided 3/8/2023.    Assessment     Educated patient on importance of moving her shoulder into end range flexion through pain in order to avoid freezing of her shoulder. Gave her 2 new AAROM exercises to perform at home. Patient did well with everything. More fatigued during prone scapular strengthening today.     Marie Is progressing well towards her goals.   Pt prognosis is Good.     Pt will continue to benefit from skilled outpatient physical therapy to address the deficits listed in the problem list box on initial evaluation, provide pt/family education and to maximize pt's level of independence in the home and community environment.     Pt's spiritual, cultural and educational needs considered and pt agreeable to plan of care and goals.    Anticipated barriers to physical therapy: none    Goals:   Short term goals: 2 weeks  Patient will become independent in HEP for maximal gains made in PT.  Patient to improve AROM flexion to 160 for improvements in reaching activities.        Long term goals: 12 weeks  Patient to demo 180 degrees of AROM flexion for improved overhead tasks.  Patient will improve strength to 5/5 for improved performance of household duties.  Patient will lift and carry 65 lb for safe transfer of daughter.    Plan     Focus on obtaining full ROM and maximize scapular and RC strength.    Sudha Mccracken, PT , DPT, OCS

## 2023-07-05 ENCOUNTER — APPOINTMENT (OUTPATIENT)
Dept: REHABILITATION | Facility: HOSPITAL | Age: 48
End: 2023-07-05
Payer: MEDICAID

## 2023-07-19 ENCOUNTER — CLINICAL SUPPORT (OUTPATIENT)
Dept: REHABILITATION | Facility: HOSPITAL | Age: 48
End: 2023-07-19
Payer: MEDICAID

## 2023-07-19 DIAGNOSIS — R53.1 WEAKNESS: Primary | ICD-10-CM

## 2023-07-19 DIAGNOSIS — M75.01 ADHESIVE CAPSULITIS OF RIGHT SHOULDER: ICD-10-CM

## 2023-07-19 PROCEDURE — 97110 THERAPEUTIC EXERCISES: CPT

## 2023-07-26 ENCOUNTER — CLINICAL SUPPORT (OUTPATIENT)
Dept: REHABILITATION | Facility: HOSPITAL | Age: 48
End: 2023-07-26
Payer: MEDICAID

## 2023-07-26 DIAGNOSIS — R53.1 WEAKNESS: Primary | ICD-10-CM

## 2023-07-26 DIAGNOSIS — M75.01 ADHESIVE CAPSULITIS OF RIGHT SHOULDER: ICD-10-CM

## 2023-07-26 PROCEDURE — 97110 THERAPEUTIC EXERCISES: CPT

## 2023-07-26 NOTE — PROGRESS NOTES
"  Physical Therapy Daily Treatment Note     Name: Marie Taylor  Clinic Number: [id number]    Therapy Diagnosis:   No diagnosis found.      Physician: Reji Domínguez MD    Visit Date: 7/26/2023  Physician Orders: PT Eval and Treat   Medical Diagnosis from Referral: M75.01 (ICD-10-CM) - Adhesive capsulitis of right shoulder  Evaluation Date: 3/8/2023  Authorization Period Expiration: 12/31/2023  Plan of Care Expiration: 6/8/2023  Visit # / Visits authorized: 14 / 20     Time In: 11:04  Time Out: 11:50  Total Appointment Time (timed & untimed codes): 46 minutes     Precautions: Standard    Subjective     Pt reports: she has been doing her new home exercises. Doesn't feel too different.  She was compliant with home exercise program.  Response to previous treatment: mild soreness  Functional change: improved overhead movement    Pain: 2/10  Location: left shoulder      Objective     Marie received therapeutic exercises to develop strength, endurance, ROM, flexibility, and posture for 32 minutes including:  Supine shoulder flexion with overpressure with stick 10 x 10" holds  Wall slide with step through for shoulder flexion x 30  IR/ER @ 90 flexion green/orange band 3x10 each  Foam roller wall slide with yellow band at wrists 2x15 - not today  Robots @ wall 4x10  AAROM shoulder flexion 3# cables 15 x 5" holds    NOT TODAY:  Sidelying ER 3x12 2#  Prone high row 3# 3x12-15  Prone T 3x12-15 0#      Marie received the following manual therapy techniques: Joint mobilizations and PROM were applied to the: left shoulder for 7 minutes, including:  PROM shoulder flexion/IR/ER  Inferior GHJ mobilizations grade III-IV       Education provided:   - HEP, current condition, PT PLAN     Written Home Exercises Provided: yes.  Exercises were reviewed and patient was able to demonstrate them prior to the end of the session.  Patient demonstrated good  understanding of the education provided.      See EMR under Patient " Instructions for exercisesprovided 3/8/2023.    Assessment     Patient was able to demo better quality of movement with shoulder flexion. Fatigue with strengthening exercises, but no increase in pain.     Marie Is progressing well towards her goals.   Pt prognosis is Good.     Pt will continue to benefit from skilled outpatient physical therapy to address the deficits listed in the problem list box on initial evaluation, provide pt/family education and to maximize pt's level of independence in the home and community environment.     Pt's spiritual, cultural and educational needs considered and pt agreeable to plan of care and goals.    Anticipated barriers to physical therapy: none    Goals:   Short term goals: 2 weeks  Patient will become independent in HEP for maximal gains made in PT.  Patient to improve AROM flexion to 160 for improvements in reaching activities.        Long term goals: 12 weeks  Patient to demo 180 degrees of AROM flexion for improved overhead tasks.  Patient will improve strength to 5/5 for improved performance of household duties.  Patient will lift and carry 65 lb for safe transfer of daughter.    Plan     Focus on obtaining full ROM and maximize scapular and RC strength.    Sudha Mccracken, PT , DPT, OCS

## 2023-07-27 NOTE — PROGRESS NOTES
"  Physical Therapy Daily Treatment Note     Name: Marie Taylor  Clinic Number: [id number]    Therapy Diagnosis:   Encounter Diagnoses   Name Primary?    Weakness Yes    Adhesive capsulitis of right shoulder      Physician: Reji Domínguez MD    Visit Date: 7/19/2023  Physician Orders: PT Eval and Treat   Medical Diagnosis from Referral: M75.01 (ICD-10-CM) - Adhesive capsulitis of right shoulder  Evaluation Date: 3/8/2023  Authorization Period Expiration: 12/31/2023  Plan of Care Expiration: 6/8/2023  Visit # / Visits authorized: 14 / 20     Time In: 11:00  Time Out: 11:55  Total Appointment Time (timed & untimed codes): 55 minutes     Precautions: Standard    Subjective     Pt reports: she's been doing her new exercises and shoulder feels a little better. Still painful at end range flexion  She was compliant with home exercise program.  Response to previous treatment: mild soreness  Functional change: improved overhead movement    Pain: 1/10  Location: left shoulder      Objective     Pre-session PROM flexion 170  Post-session PROM flexion 177      Marie received therapeutic exercises to develop strength, endurance, ROM, flexibility, and posture for 50 minutes including:  Rower 5 min  Supine shoulder flexion with overpressure with stick 10 x 10" holds  Wall slide with step through for shoulder flexion x 30  IR/ER @ 90 flexion green/orange band 3x10 each  Foam roller wall slide with yellow band at wrists 2x15   Robots @ wall 4x10   Sidelying ER 3x12 2#  Prone high row 3# 3x12-15  Prone T 3x12-15 0#      Marie received the following manual therapy techniques: Joint mobilizations and PROM were applied to the: left shoulder for 5 minutes, including:  PROM shoulder flexion/IR/ER  Inferior GHJ mobilizations grade III-IV       Education provided:   - HEP, current condition, PT PLAN     Written Home Exercises Provided: yes.  Exercises were reviewed and patient was able to demonstrate them prior to the end of the " session.  Patient demonstrated good  understanding of the education provided.      See EMR under Patient Instructions for exercisesprovided 3/8/2023.    Assessment     Improved shoulder flexion active and passive movement after scapular control and RC strengthening exercises. Patient cont to have empty end feel at end range. Advised her to continue with strengthening and to push end range flexion.    Marie Is progressing well towards her goals.   Pt prognosis is Good.     Pt will continue to benefit from skilled outpatient physical therapy to address the deficits listed in the problem list box on initial evaluation, provide pt/family education and to maximize pt's level of independence in the home and community environment.     Pt's spiritual, cultural and educational needs considered and pt agreeable to plan of care and goals.    Anticipated barriers to physical therapy: none    Goals:   Short term goals: 2 weeks  Patient will become independent in HEP for maximal gains made in PT. _ MET  Patient to improve AROM flexion to 160 for improvements in reaching activities. - MET        Long term goals: 12 weeks  Patient to demo 180 degrees of AROM flexion for improved overhead tasks.  Patient will improve strength to 5/5 for improved performance of household duties.  Patient will lift and carry 65 lb for safe transfer of daughter.    Plan     Focus on obtaining full ROM and maximize scapular and RC strength.    Sudha Mccracken, PT , DPT, OCS

## 2023-08-09 ENCOUNTER — CLINICAL SUPPORT (OUTPATIENT)
Dept: REHABILITATION | Facility: HOSPITAL | Age: 48
End: 2023-08-09
Payer: MEDICAID

## 2023-08-09 DIAGNOSIS — R53.1 WEAKNESS: Primary | ICD-10-CM

## 2023-08-09 DIAGNOSIS — M75.01 ADHESIVE CAPSULITIS OF RIGHT SHOULDER: ICD-10-CM

## 2023-08-09 PROCEDURE — 97110 THERAPEUTIC EXERCISES: CPT

## 2023-08-16 ENCOUNTER — CLINICAL SUPPORT (OUTPATIENT)
Dept: REHABILITATION | Facility: HOSPITAL | Age: 48
End: 2023-08-16
Payer: MEDICAID

## 2023-08-16 DIAGNOSIS — M75.01 ADHESIVE CAPSULITIS OF RIGHT SHOULDER: ICD-10-CM

## 2023-08-16 DIAGNOSIS — R53.1 WEAKNESS: Primary | ICD-10-CM

## 2023-08-16 PROCEDURE — 97110 THERAPEUTIC EXERCISES: CPT

## 2023-08-21 NOTE — PROGRESS NOTES
"  Physical Therapy Daily Treatment Note     Name: Marie Taylor  Clinic Number: [id number]    Therapy Diagnosis:   Encounter Diagnoses   Name Primary?    Weakness Yes    Adhesive capsulitis of right shoulder          Physician: Reji Domínguez MD    Visit Date: 8/16/2023  Physician Orders: PT Eval and Treat   Medical Diagnosis from Referral: M75.01 (ICD-10-CM) - Adhesive capsulitis of right shoulder  Evaluation Date: 3/8/2023  Authorization Period Expiration: 12/31/2023  Plan of Care Expiration: 6/8/2023  Visit # / Visits authorized: 16 / 20     Time In: 11:04  Time Out: 12:00  Total Appointment Time (timed & untimed codes): 56 minutes     Precautions: Standard    Subjective     Pt reports: her shoulder is feeling better.  She was compliant with home exercise program.  Response to previous treatment: mild soreness  Functional change: improved overhead movement    Pain: 0/10  Location: left shoulder      Objective     Shoulder flexion PROM pre-session: 170 degrees  Shoulder flexion PROM post-session: 178 degrees    Marie received therapeutic exercises to develop strength, endurance, ROM, flexibility, and posture for 40 minutes including:  Supine shoulder flexion with overpressure with stick 10 x 10" holds  Wall slide with step through for shoulder flexion x 30  IR/ER @ 90 flexion green/orange band 3x10 each  Foam roller wall slide with yellow band at wrists 2x15   Robots @ wall 4x10  Sidelying ER 3x12 0#  B ER red band 3x12  B Ts yellow band 4x8  D2 flexion (left only) yellow band 3x8      Marie received the following manual therapy techniques: Joint mobilizations and PROM were applied to the: left shoulder for 10 minutes, including:  PROM shoulder flexion/IR/ER  Inferior GHJ mobilizations grade III-IV       Education provided:   - HEP, current condition, PT PLAN      Assessment     Progressing nicely. Better quality of movement with overhead activities and strengthening. Cont to lack last remaining degrees " of flexion.     Marie Is progressing well towards her goals.   Pt prognosis is Good.     Pt will continue to benefit from skilled outpatient physical therapy to address the deficits listed in the problem list box on initial evaluation, provide pt/family education and to maximize pt's level of independence in the home and community environment.     Pt's spiritual, cultural and educational needs considered and pt agreeable to plan of care and goals.    Anticipated barriers to physical therapy: none    Goals:   Short term goals: 2 weeks  Patient will become independent in HEP for maximal gains made in PT.  Patient to improve AROM flexion to 160 for improvements in reaching activities.        Long term goals: 12 weeks  Patient to demo 180 degrees of AROM flexion for improved overhead tasks.  Patient will improve strength to 5/5 for improved performance of household duties.  Patient will lift and carry 65 lb for safe transfer of daughter.    Plan     Focus on obtaining full ROM and maximize scapular and RC strength.    Sudha Mccracken, PT , DPT, OCS

## 2023-08-23 ENCOUNTER — CLINICAL SUPPORT (OUTPATIENT)
Dept: REHABILITATION | Facility: HOSPITAL | Age: 48
End: 2023-08-23
Payer: MEDICAID

## 2023-08-23 DIAGNOSIS — M75.01 ADHESIVE CAPSULITIS OF RIGHT SHOULDER: ICD-10-CM

## 2023-08-23 DIAGNOSIS — R53.1 WEAKNESS: Primary | ICD-10-CM

## 2023-08-23 PROCEDURE — 97110 THERAPEUTIC EXERCISES: CPT

## 2023-08-23 NOTE — PROGRESS NOTES
"  Physical Therapy Daily Treatment Note     Name: Marie Taylor  Clinic Number: [id number]    Therapy Diagnosis:   Encounter Diagnoses   Name Primary?    Weakness Yes    Adhesive capsulitis of right shoulder      Physician: Reji Domínguez MD    Visit Date: 8/23/2023  Physician Orders: PT Eval and Treat   Medical Diagnosis from Referral: M75.01 (ICD-10-CM) - Adhesive capsulitis of right shoulder  Evaluation Date: 3/8/2023  Authorization Period Expiration: 12/31/2023  Plan of Care Expiration: 6/8/2023  Visit # / Visits authorized: 17 / 20     Time In: 11:05  Time Out: 12:00  Total Appointment Time (timed & untimed codes): 55 minutes     Precautions: Standard    Subjective     Pt reports: shoulder is hurting again. She hasn't been doing her home exercises due to pain.   She was not compliant with home exercise program.  Response to previous treatment: mild soreness  Functional change: improved overhead movement    Pain: 5/10  Location: left shoulder      Objective     Shoulder flexion PROM pre-session: 167 degrees  Shoulder flexion PROM post-session: 175 degrees    Marie received therapeutic exercises to develop strength, endurance, ROM, flexibility, and posture for 53 minutes including:  Sidelying shoulder flexion (full range) 3# 3x12  Sidelying shoulder ER 3# 3x12  Supine robots with foam roller 2x20  Foam roller wall slide (unilateral with small roller) 3x12  Supine shoulder flexion with overpressure with stick 10 x 10" holds      Marie received the following manual therapy techniques: Joint mobilizations and PROM were applied to the: left shoulder for 2 minutes, including:  PROM shoulder flexion/IR/ER  Inferior GHJ mobilizations grade III-IV       Education provided:   - HEP, current condition, PT PLAN      Assessment     Increased pain and loss of ROM today. Patient has been non-compliant with home exercises due to pain. Urged her to perform exercises even when she is in pain. Able to improve ROM and " quality of movement at conclusion of session.     Marie Is progressing well towards her goals.   Pt prognosis is Good.     Pt will continue to benefit from skilled outpatient physical therapy to address the deficits listed in the problem list box on initial evaluation, provide pt/family education and to maximize pt's level of independence in the home and community environment.     Pt's spiritual, cultural and educational needs considered and pt agreeable to plan of care and goals.    Anticipated barriers to physical therapy: none    Goals:   Short term goals: 2 weeks  Patient will become independent in HEP for maximal gains made in PT. - MET  Patient to improve AROM flexion to 160 for improvements in reaching activities. - MET        Long term goals: 12 weeks  Patient to demo 180 degrees of AROM flexion for improved overhead tasks.  Patient will improve strength to 5/5 for improved performance of household duties.  Patient will lift and carry 65 lb for safe transfer of daughter.    Plan     Focus on obtaining full ROM and maximize scapular and RC strength.    Sudha Mccracken, PT , DPT, OCS

## 2023-08-30 ENCOUNTER — CLINICAL SUPPORT (OUTPATIENT)
Dept: REHABILITATION | Facility: HOSPITAL | Age: 48
End: 2023-08-30
Payer: MEDICAID

## 2023-08-30 DIAGNOSIS — M75.01 ADHESIVE CAPSULITIS OF RIGHT SHOULDER: ICD-10-CM

## 2023-08-30 DIAGNOSIS — R53.1 WEAKNESS: Primary | ICD-10-CM

## 2023-08-30 PROCEDURE — 97110 THERAPEUTIC EXERCISES: CPT

## 2023-08-30 NOTE — PROGRESS NOTES
Physical Therapy Daily Treatment Note     Name: Marie Taylor  Clinic Number: [id number]    Therapy Diagnosis:   Encounter Diagnoses   Name Primary?    Weakness Yes    Adhesive capsulitis of right shoulder        Physician: Reji Domínguez MD    Visit Date: 8/30/2023  Physician Orders: PT Eval and Treat   Medical Diagnosis from Referral: M75.01 (ICD-10-CM) - Adhesive capsulitis of right shoulder  Evaluation Date: 3/8/2023  Authorization Period Expiration: 12/31/2023  Plan of Care Expiration: 6/8/2023  Visit # / Visits authorized: 18 / 20     Time In: 11:05  Time Out: 12:00  Total Appointment Time (timed & untimed codes): 55 minutes     Precautions: Standard    Subjective     Pt reports: shoulder hurts sometimes. Still doesn't feel like it's back to normal but she has been doing her exercises.  She was compliant with home exercise program.  Response to previous treatment: mild soreness  Functional change: improved overhead movement    Pain: 3/10  Location: left shoulder      Objective     Shoulder flexion PROM pre-session: 172 degrees  Shoulder flexion PROM post-session: 178 degrees    Marie received therapeutic exercises to develop strength, endurance, ROM, flexibility, and posture for 53 minutes including:  Sidelying shoulder flexion (full range) 3# 3x12  Sidelying shoulder ER 3# 3x12  Supine robots with foam roller 2x20  Foam roller wall slide (unilateral with small roller) 3x12  Wall ball overhead side to side 4 x fatigue  Wall ball 90 abduction alphabets x 4      Marie received the following manual therapy techniques: Joint mobilizations and PROM were applied to the: left shoulder for 2 minutes, including:  PROM shoulder flexion/IR/ER  Inferior GHJ mobilizations grade III-IV       Education provided:   - HEP, current condition, PT PLAN      Assessment     Much improved ROM today with less pain and less stiffness at end range. Able to perform more challenging exercises due to this positive change.  Encouraged patient to continue with compliance with home exercises.    Marie Is progressing well towards her goals.   Pt prognosis is Good.     Pt will continue to benefit from skilled outpatient physical therapy to address the deficits listed in the problem list box on initial evaluation, provide pt/family education and to maximize pt's level of independence in the home and community environment.     Pt's spiritual, cultural and educational needs considered and pt agreeable to plan of care and goals.    Anticipated barriers to physical therapy: none    Goals:   Short term goals: 2 weeks  Patient will become independent in HEP for maximal gains made in PT. - MET  Patient to improve AROM flexion to 160 for improvements in reaching activities. - MET        Long term goals: 12 weeks  Patient to demo 180 degrees of AROM flexion for improved overhead tasks.  Patient will improve strength to 5/5 for improved performance of household duties.  Patient will lift and carry 65 lb for safe transfer of daughter.    Plan     Focus on obtaining full ROM and maximize scapular and RC strength.    Sudha Mccracken, PT , DPT, OCS

## 2023-09-07 ENCOUNTER — CLINICAL SUPPORT (OUTPATIENT)
Dept: REHABILITATION | Facility: HOSPITAL | Age: 48
End: 2023-09-07
Payer: MEDICAID

## 2023-09-07 DIAGNOSIS — M75.01 ADHESIVE CAPSULITIS OF RIGHT SHOULDER: ICD-10-CM

## 2023-09-07 DIAGNOSIS — R53.1 WEAKNESS: Primary | ICD-10-CM

## 2023-09-07 PROCEDURE — 97110 THERAPEUTIC EXERCISES: CPT

## 2023-09-07 NOTE — PROGRESS NOTES
Physical Therapy Daily Treatment Note     Name: Marie Taylor  Clinic Number: [id number]    Therapy Diagnosis:   Encounter Diagnoses   Name Primary?    Weakness Yes    Adhesive capsulitis of right shoulder        Physician: Reji Domínguez MD    Visit Date: 9/7/2023  Physician Orders: PT Eval and Treat   Medical Diagnosis from Referral: M75.01 (ICD-10-CM) - Adhesive capsulitis of right shoulder  Evaluation Date: 3/8/2023  Authorization Period Expiration: 12/31/2023  Plan of Care Expiration: 6/8/2023  Visit # / Visits authorized: 19 / 40     Time In: 13:05  Time Out: 14:00  Total Appointment Time (timed & untimed codes): 55 minutes     Precautions: Standard    Subjective     Pt reports: she hasn't been doing her exercises. Shoulder is stiff today.  She was compliant with home exercise program.  Response to previous treatment: mild soreness  Functional change: improved overhead movement    Pain: 3/10  Location: left shoulder      Objective     Shoulder flexion PROM pre-session: 168 degrees  Shoulder flexion PROM post-session: 178 degrees    Marie received therapeutic exercises to develop strength, endurance, ROM, flexibility, and posture for 53 minutes including:  Sidelying shoulder flexion (full range) 3x12  Sidelying shoulder ER 3# 3x12  standing robots with yellow band 3x12  Foam roller wall slide with scap punch 3x12  Shoulder IR/ER @ 90 flexion 4x10-12 green band  Wall ball overhead side to side 4 x fatigue        Marie received the following manual therapy techniques: Joint mobilizations and PROM were applied to the: left shoulder for 2 minutes, including:  PROM shoulder flexion/IR/ER  Inferior GHJ mobilizations grade III-IV       Education provided:   - HEP, current condition, PT PLAN      Assessment     Limited ROM into flexion at start of session with empty end feel. Able to restore movement at conclusion of session. Patient cont to demo fatigue with posterior cuff and periscapular strengthening  exercises.     Marie Is progressing well towards her goals.   Pt prognosis is Good.     Pt will continue to benefit from skilled outpatient physical therapy to address the deficits listed in the problem list box on initial evaluation, provide pt/family education and to maximize pt's level of independence in the home and community environment.     Pt's spiritual, cultural and educational needs considered and pt agreeable to plan of care and goals.    Anticipated barriers to physical therapy: none    Goals:   Short term goals: 2 weeks  Patient will become independent in HEP for maximal gains made in PT. - MET  Patient to improve AROM flexion to 160 for improvements in reaching activities. - MET        Long term goals: 12 weeks  Patient to demo 180 degrees of AROM flexion for improved overhead tasks.   Patient will improve strength to 5/5 for improved performance of household duties.  Patient will lift and carry 65 lb for safe transfer of daughter.    Plan     Focus on obtaining full ROM and maximize scapular and RC strength.    Sudha Mccracken, PT , DPT, OCS

## 2023-09-14 ENCOUNTER — CLINICAL SUPPORT (OUTPATIENT)
Dept: REHABILITATION | Facility: HOSPITAL | Age: 48
End: 2023-09-14
Payer: MEDICAID

## 2023-09-14 DIAGNOSIS — M75.01 ADHESIVE CAPSULITIS OF RIGHT SHOULDER: ICD-10-CM

## 2023-09-14 DIAGNOSIS — R53.1 WEAKNESS: Primary | ICD-10-CM

## 2023-09-14 PROCEDURE — 97110 THERAPEUTIC EXERCISES: CPT

## 2023-09-14 NOTE — PROGRESS NOTES
Physical Therapy Daily Treatment Note     Name: Marie Taylor  Clinic Number: [id number]    Therapy Diagnosis:   Encounter Diagnoses   Name Primary?    Weakness Yes    Adhesive capsulitis of right shoulder        Physician: Reji Domínguez MD    Visit Date: 9/14/2023  Physician Orders: PT Eval and Treat   Medical Diagnosis from Referral: M75.01 (ICD-10-CM) - Adhesive capsulitis of right shoulder  Evaluation Date: 3/8/2023  Authorization Period Expiration: 12/31/2023  Plan of Care Expiration: 6/8/2023  Visit # / Visits authorized: 20 / 40     Time In: 13:00  Time Out: 14:00  Total Appointment Time (timed & untimed codes): 60 minutes     Precautions: Standard    Subjective     Pt reports: she did her exercises this week. Right shoulder is starting to feel a little stiff.  She was compliant with home exercise program.  Response to previous treatment: mild soreness  Functional change: improved overhead movement    Pain: 1/10  Location: left shoulder      Objective     Shoulder flexion PROM pre-session: 178 degrees  Shoulder flexion PROM post-session: 180 degrees    Marie received therapeutic exercises to develop strength, endurance, ROM, flexibility, and posture for 53 minutes including:  Sidelying shoulder abduction to 90 3# 3x12  Sidelying shoulder ER 3# 3x12  standing robots with yellow band 3x12  Foam roller wall slide with yellow band 3x12  Shoulder flexion full range with yelllow band at wrists 3x8 (standing back at wall)  Shoulder IR/ER @ 90 flexion 4x10-12 green band  Shoulder @ 90 flexion overhead press green band pulling into IR  2x10  Shoulder @ 90 flexion overhead press green band pulling into ER  2x10      Marie received the following manual therapy techniques: Joint mobilizations and PROM were applied to the: left shoulder for 4 minutes, including:  PROM shoulder flexion/IR/ER  Inferior GHJ mobilizations grade III-IV       Education provided:   - HEP, current condition, PT PLAN      Assessment      Much improved PROM and AROM shoulder flexion noted today. Patient's performance in sessions has improved with improved compliance in HEP.     Marie Is progressing well towards her goals.   Pt prognosis is Good.     Pt will continue to benefit from skilled outpatient physical therapy to address the deficits listed in the problem list box on initial evaluation, provide pt/family education and to maximize pt's level of independence in the home and community environment.     Pt's spiritual, cultural and educational needs considered and pt agreeable to plan of care and goals.    Anticipated barriers to physical therapy: none    Goals:   Short term goals: 2 weeks  Patient will become independent in HEP for maximal gains made in PT. - MET  Patient to improve AROM flexion to 160 for improvements in reaching activities. - MET        Long term goals: 12 weeks  Patient to demo 180 degrees of AROM flexion for improved overhead tasks.   Patient will improve strength to 5/5 for improved performance of household duties.  Patient will lift and carry 65 lb for safe transfer of daughter.    Plan     Focus on obtaining full ROM and maximize scapular and RC strength.    Sudha Mccracken, PT , DPT, OCS

## 2023-09-20 ENCOUNTER — CLINICAL SUPPORT (OUTPATIENT)
Dept: REHABILITATION | Facility: HOSPITAL | Age: 48
End: 2023-09-20
Payer: MEDICAID

## 2023-09-20 DIAGNOSIS — R53.1 WEAKNESS: Primary | ICD-10-CM

## 2023-09-20 DIAGNOSIS — M75.01 ADHESIVE CAPSULITIS OF RIGHT SHOULDER: ICD-10-CM

## 2023-09-20 PROCEDURE — 97110 THERAPEUTIC EXERCISES: CPT

## 2023-09-21 NOTE — PROGRESS NOTES
Physical Therapy Daily Treatment Note     Name: Marie Taylor  Clinic Number: [id number]    Therapy Diagnosis:   Encounter Diagnoses   Name Primary?    Weakness Yes    Adhesive capsulitis of right shoulder        Physician: Reji Domínguez MD    Visit Date: 9/20/2023  Physician Orders: PT Eval and Treat   Medical Diagnosis from Referral: M75.01 (ICD-10-CM) - Adhesive capsulitis of right shoulder  Evaluation Date: 3/8/2023  Authorization Period Expiration: 12/31/2023  Plan of Care Expiration: 6/8/2023  Visit # / Visits authorized: 21 / 40     Time In: 11:25  Time Out: 12:15  Total Appointment Time (timed & untimed codes): 50 minutes     Precautions: Standard    Subjective     Pt reports: she did her exercises this week. Late because of a school function at her daughter's school.  She was compliant with home exercise program.  Response to previous treatment: mild soreness  Functional change: improved overhead movement    Pain: 0/10  Location: left shoulder      Objective     Shoulder flexion PROM pre-session: 178 degrees  Shoulder flexion PROM post-session: 180 degrees    Marie received therapeutic exercises to develop strength, endurance, ROM, flexibility, and posture for 48 minutes including:  Sidelying shoulder abduction to 90 3# 3x12  Sidelying shoulder ER 3# 3x12  standing robots with yellow band 3x12  Foam roller wall slide with yellow band 3x12  Shoulder flexion full range with yelllow band at wrists 3x8 (standing back at wall)  Shoulder IR/ER @ 90 flexion 4x10-12 green band  Shoulder @ 90 flexion overhead press green band pulling into IR  2x10  Shoulder @ 90 flexion overhead press green band pulling into ER  2x10      Marie received the following manual therapy techniques: Joint mobilizations and PROM were applied to the: left shoulder for 2 minutes, including:  PROM shoulder flexion/IR/ER  Inferior GHJ mobilizations grade III-IV       Education provided:   - HEP, current condition, PT  PLAN      Assessment     Cont to demo good ROM which allowed for better performance of strengthening exercises. Cont to have most weakness with overhead movement.     Marie Is progressing well towards her goals.   Pt prognosis is Good.     Pt will continue to benefit from skilled outpatient physical therapy to address the deficits listed in the problem list box on initial evaluation, provide pt/family education and to maximize pt's level of independence in the home and community environment.     Pt's spiritual, cultural and educational needs considered and pt agreeable to plan of care and goals.    Anticipated barriers to physical therapy: none    Goals:   Short term goals: 2 weeks  Patient will become independent in HEP for maximal gains made in PT. - MET  Patient to improve AROM flexion to 160 for improvements in reaching activities. - MET        Long term goals: 12 weeks  Patient to demo 180 degrees of AROM flexion for improved overhead tasks.   Patient will improve strength to 5/5 for improved performance of household duties.  Patient will lift and carry 65 lb for safe transfer of daughter.    Plan     Focus on obtaining full ROM and maximize scapular and RC strength. Transfer care to Jeferson Persaud, PT, DPT.    Sudha Mccracken, PT , DPT, OCS

## 2023-10-04 ENCOUNTER — CLINICAL SUPPORT (OUTPATIENT)
Dept: REHABILITATION | Facility: HOSPITAL | Age: 48
End: 2023-10-04
Payer: COMMERCIAL

## 2023-10-04 DIAGNOSIS — M25.512 ACUTE PAIN OF LEFT SHOULDER: ICD-10-CM

## 2023-10-04 PROCEDURE — 97530 THERAPEUTIC ACTIVITIES: CPT | Performed by: PHYSICAL THERAPIST

## 2023-10-04 PROCEDURE — 97140 MANUAL THERAPY 1/> REGIONS: CPT | Performed by: PHYSICAL THERAPIST

## 2023-10-04 NOTE — PROGRESS NOTES
Physical Therapy Daily Treatment Note     Name: Marie Taylor  Clinic Number: [id number]    Therapy Diagnosis:   Encounter Diagnosis   Name Primary?    Acute pain of left shoulder      Physician: Reji Domínguez MD    Visit Date: 10/4/2023  Physician Orders: PT Eval and Treat   Medical Diagnosis from Referral: M75.01 (ICD-10-CM) - Adhesive capsulitis of right shoulder  Evaluation Date: 3/8/2023  Authorization Period Expiration: 12/31/2023  Plan of Care Expiration: 6/8/2023  Visit # / Visits authorized: 22 / 40     Time In: 11:02  Time Out: 12:00  Total Appointment Time (timed & untimed codes): 58 minutes     Precautions: Standard    Subjective     Pt reports: I did not do my exercises this past week but it is not hurting  She was compliant with home exercise program.  Response to previous treatment: mild soreness  Functional change: improved overhead movement    Pain: 0/10  Location: left shoulder      Objective     Shoulder flexion PROM pre-session: 178 degrees  Shoulder flexion PROM post-session: 180 degrees    Marie received therapeutic activities to develop strength, endurance, ROM, flexibility, and posture for 48 minutes including:    Supine ER GTB 2 x 15  Supine lat stretch with YTB handcuffs 3 x 10  Genie IR GTB 3 x 15  Cross body serratus press 2 kg ball 2 x 15 throws      NT  Sidelying shoulder abduction to 90 3# 3x12  Sidelying shoulder ER 3# 3x12  standing robots with yellow band 3x12  Foam roller wall slide with yellow band 3x12  Shoulder flexion full range with yelllow band at wrists 3x8 (standing back at wall)  Shoulder IR/ER @ 90 flexion 4x10-12 green band  Shoulder @ 90 flexion overhead press green band pulling into IR  2x10  Shoulder @ 90 flexion overhead press green band pulling into ER  2x10      Marie received the following manual therapy techniques: Joint mobilizations and PROM were applied to the: left shoulder for 10 minutes, including:    PROM shoulder flexion/IR/ER  Inferior GHJ  mobilizations grade III-IV  Cross body MET  STM to subscap        Education provided:   - HEP, current condition, PT PLAN      Assessment     Presented with the most weakness to her subscap Limitations to AC joint is limited her end range motion and noted [place] hiking with end range, needs to improve low trap and serratus strength    Marie Is progressing well towards her goals.   Pt prognosis is Good.     Pt will continue to benefit from skilled outpatient physical therapy to address the deficits listed in the problem list box on initial evaluation, provide pt/family education and to maximize pt's level of independence in the home and community environment.     Pt's spiritual, cultural and educational needs considered and pt agreeable to plan of care and goals.    Anticipated barriers to physical therapy: none    Goals:   Short term goals: 2 weeks  Patient will become independent in HEP for maximal gains made in PT. - MET  Patient to improve AROM flexion to 160 for improvements in reaching activities. - MET        Long term goals: 12 weeks  Patient to demo 180 degrees of AROM flexion for improved overhead tasks.   Patient will improve strength to 5/5 for improved performance of household duties.  Patient will lift and carry 65 lb for safe transfer of daughter.    Plan     Focus on obtaining full ROM and maximize scapular and RC strength. Transfer care to Jeferson Persaud, PT, DPT.    Jeferson Persaud, PT , DPT, OCS

## 2023-10-11 ENCOUNTER — CLINICAL SUPPORT (OUTPATIENT)
Dept: REHABILITATION | Facility: HOSPITAL | Age: 48
End: 2023-10-11
Payer: COMMERCIAL

## 2023-10-11 DIAGNOSIS — M25.512 ACUTE PAIN OF LEFT SHOULDER: Primary | ICD-10-CM

## 2023-10-11 PROCEDURE — 97110 THERAPEUTIC EXERCISES: CPT | Performed by: PHYSICAL THERAPIST

## 2023-10-11 NOTE — PROGRESS NOTES
"  Physical Therapy Daily Treatment Note     Name: Marie Taylor  Clinic Number: [id number]    Therapy Diagnosis:   Encounter Diagnosis   Name Primary?    Acute pain of left shoulder Yes     Physician: Reji Domínguez MD    Visit Date: 10/11/2023  Physician Orders: PT Eval and Treat   Medical Diagnosis from Referral: M75.01 (ICD-10-CM) - Adhesive capsulitis of right shoulder  Evaluation Date: 3/8/2023  Authorization Period Expiration: 12/31/2023  Plan of Care Expiration: 6/8/2023  Visit # / Visits authorized: 23 / 40     Time In: 1058  Time Out: 1155  Total Appointment Time (timed & untimed codes): 57 minutes     Precautions: Standard    Subjective     Pt reports: I was better about doing my exercises this week. No pain except when I make quick movements in certain directions  She was compliant with home exercise program.  Response to previous treatment: mild soreness  Functional change: improved overhead movement    Pain: 0/10  Location: left shoulder      Objective     Shoulder flexion PROM pre-session: 178 degrees  Shoulder flexion PROM post-session: 180 degrees    Marie received therapeutic activities to develop strength, endurance, ROM, flexibility, and posture for 47 minutes including:      Genie IR GTB 3 x 15  Cross body serratus press GTB 3 x 12  Prone extension 3# 3 x 15  Prone row 5# 3 x 15  Prone mid trap retraining 8x 8"  -reps 2 x 8  Swiss ball on wall CW/CCW 3 x 10     NT  Sidelying shoulder abduction to 90 3# 3x12  Sidelying shoulder ER 3# 3x12  standing robots with yellow band 3x12  Foam roller wall slide with yellow band 3x12  Shoulder flexion full range with yelllow band at wrists 3x8 (standing back at wall)  Shoulder IR/ER @ 90 flexion 4x10-12 green band  Shoulder @ 90 flexion overhead press green band pulling into IR  2x10  Shoulder @ 90 flexion overhead press green band pulling into ER  2x10      Marie received the following manual therapy techniques: Joint mobilizations and PROM were " applied to the: left shoulder for 10 minutes, including:    PROM shoulder flexion/IR/ER  Inferior GHJ mobilizations grade III-IV  Cross body MET  STM to subscap        Education provided:   - HEP, current condition, PT PLAN      Assessment     Continue to progress subscap strengthening to reduce anterior glide overhead. Most weakness seen with genie exercise. Added thoracic rotation with serratus press. Motivated to do her exercises in order to future discharge     Marie Is progressing well towards her goals.   Pt prognosis is Good.     Pt will continue to benefit from skilled outpatient physical therapy to address the deficits listed in the problem list box on initial evaluation, provide pt/family education and to maximize pt's level of independence in the home and community environment.     Pt's spiritual, cultural and educational needs considered and pt agreeable to plan of care and goals.    Anticipated barriers to physical therapy: none    Goals:   Short term goals: 2 weeks  Patient will become independent in HEP for maximal gains made in PT. - MET  Patient to improve AROM flexion to 160 for improvements in reaching activities. - MET        Long term goals: 12 weeks  Patient to demo 180 degrees of AROM flexion for improved overhead tasks.   Patient will improve strength to 5/5 for improved performance of household duties.  Patient will lift and carry 65 lb for safe transfer of daughter.    Plan     Focus on obtaining full ROM and maximize scapular and RC strength. Transfer care to Jeferson Persaud, PT, DPT.    Jeferson Persaud, PT , DPT, OCS

## 2023-10-18 ENCOUNTER — CLINICAL SUPPORT (OUTPATIENT)
Dept: REHABILITATION | Facility: HOSPITAL | Age: 48
End: 2023-10-18
Attending: ORTHOPAEDIC SURGERY
Payer: MEDICAID

## 2023-10-18 DIAGNOSIS — M25.512 ACUTE PAIN OF LEFT SHOULDER: Primary | ICD-10-CM

## 2023-10-18 PROCEDURE — 97110 THERAPEUTIC EXERCISES: CPT | Performed by: PHYSICAL THERAPIST

## 2023-10-18 NOTE — PROGRESS NOTES
"  Physical Therapy Daily Treatment Note     Name: Marie Taylor  Clinic Number: [id number]    Therapy Diagnosis:   Encounter Diagnosis   Name Primary?    Acute pain of left shoulder Yes     Physician: Reji Domínguez MD    Visit Date: 10/18/2023  Physician Orders: PT Eval and Treat   Medical Diagnosis from Referral: M75.01 (ICD-10-CM) - Adhesive capsulitis of right shoulder  Evaluation Date: 3/8/2023  Authorization Period Expiration: 12/31/2023  Plan of Care Expiration: 6/8/2023  Visit # / Visits authorized: 24 / 40     Time In: 1102  Time Out: 1156  Total Appointment Time (timed & untimed codes): 54 minutes     Precautions: Standard    Subjective     Pt reports: No pain today but I didn't do my exercises this past week. Going to [place] for my birthday this weekend  She was compliant with home exercise program.  Response to previous treatment: mild soreness  Functional change: improved overhead movement    Pain: 0/10  Location: left shoulder      Objective   Extender used: trained physical therapy technician    Shoulder flexion PROM pre-session: 178 degrees  Shoulder flexion PROM post-session: 180 degrees    Marie received therapeutic activities to develop strength, endurance, ROM, flexibility, and posture for 42 minutes including:    UBE 8' for shoulder ROM and cardio endurance training  Sidelying shoulder ER 3# 3x12  Scaptions 2# 3 x 12  Wall clock YTB 3 x 12  Pushup shoulder taps 3 x 12 B   Row with ER GTB 3 x 12  Standing T OTB 3 x 15  Standing Y OTB 3 x 15    NT  Genie IR GTB 3 x 15  Cross body serratus press GTB 3 x 12  Prone extension 3# 3 x 15  Prone row 5# 3 x 15  Prone mid trap retraining 8x 8"  -reps 2 x 8  Swiss ball on wall CW/CCW 3 x 10   Sidelying shoulder abduction to 90 3# 3x12    standing robots with yellow band 3x12  Foam roller wall slide with yellow band 3x12  Shoulder flexion full range with yelllow band at wrists 3x8 (standing back at wall)  Shoulder IR/ER @ 90 flexion 4x10-12 green " band  Shoulder @ 90 flexion overhead press green band pulling into IR  2x10  Shoulder @ 90 flexion overhead press green band pulling into ER  2x10      Marie received the following manual therapy techniques: Joint mobilizations and PROM were applied to the: left shoulder for 12 minutes, including:    PROM shoulder flexion/IR/ER  Inferior GHJ mobilizations grade III-IV  Cross body MET  STM to subscap        Education provided:   - HEP, current condition, PT PLAN      Assessment     Progressed with strengthening to shoulder, most difficulty with the wall clock due to fatigue felt in cuff referral down the arm. Good form with Y, just limited strength with any lifting overhead.     Marie Is progressing well towards her goals.   Pt prognosis is Good.     Pt will continue to benefit from skilled outpatient physical therapy to address the deficits listed in the problem list box on initial evaluation, provide pt/family education and to maximize pt's level of independence in the home and community environment.     Pt's spiritual, cultural and educational needs considered and pt agreeable to plan of care and goals.    Anticipated barriers to physical therapy: none    Goals:   Short term goals: 2 weeks  Patient will become independent in HEP for maximal gains made in PT. - MET  Patient to improve AROM flexion to 160 for improvements in reaching activities. - MET        Long term goals: 12 weeks  Patient to demo 180 degrees of AROM flexion for improved overhead tasks.   Patient will improve strength to 5/5 for improved performance of household duties.  Patient will lift and carry 65 lb for safe transfer of daughter.    Plan     Focus on obtaining full ROM and maximize scapular and RC strength. Transfer care to Jeferson Persaud, PT, DPT.    Jeferson Persaud, PT , DPT, OCS

## 2023-10-25 ENCOUNTER — CLINICAL SUPPORT (OUTPATIENT)
Dept: REHABILITATION | Facility: HOSPITAL | Age: 48
End: 2023-10-25
Payer: COMMERCIAL

## 2023-10-25 DIAGNOSIS — M25.512 ACUTE PAIN OF LEFT SHOULDER: Primary | ICD-10-CM

## 2023-10-25 PROCEDURE — 97110 THERAPEUTIC EXERCISES: CPT | Performed by: PHYSICAL THERAPIST

## 2023-10-25 NOTE — PROGRESS NOTES
"  Physical Therapy Daily Treatment Note     Name: Marie Taylor  Clinic Number: [id number]    Therapy Diagnosis:   Encounter Diagnosis   Name Primary?    Acute pain of left shoulder Yes     Physician: Reji Domínguez MD    Visit Date: 10/25/2023  Physician Orders: PT Eval and Treat   Medical Diagnosis from Referral: M75.01 (ICD-10-CM) - Adhesive capsulitis of right shoulder  Evaluation Date: 3/8/2023  Authorization Period Expiration: 12/31/2023  Plan of Care Expiration: 6/8/2023  Visit # / Visits authorized: 25 / 40     Time In: 1102  Time Out: 1158  Total Appointment Time (timed & untimed codes): 56 minutes     Precautions: Standard    Subjective     Pt reports: I need to get stronger because it's getting harder for me to lift my daughter  She was compliant with home exercise program.  Response to previous treatment: mild soreness  Functional change: improved overhead movement    Pain: 0/10  Location: left shoulder      Objective   Extender used: trained physical therapy technician    Shoulder flexion PROM pre-session: 178 degrees  Shoulder flexion PROM post-session: 180 degrees    Marie received therapeutic activities to develop strength, endurance, ROM, flexibility, and posture for 44 minutes including:    UBE 8' for shoulder ROM and cardio endurance training  Sidelying shoulder ER 2# with reach 3x12  Scaptions 2# 3 x 12  Supine ER OTB hooked on shoe 3 x 15  Supine IR OTB 3 x 15  No money GTB 3 x 12 3"    NT  Wall clock YTB 3 x 12  Pushup shoulder taps 3 x 12 B   Row with ER GTB 3 x 12  Standing T OTB 3 x 15  Standing Y OTB 3 x 15  Genie IR GTB 3 x 15  Cross body serratus press GTB 3 x 12  Prone extension 3# 3 x 15  Prone row 5# 3 x 15  Prone mid trap retraining 8x 8"  -reps 2 x 8  Swiss ball on wall CW/CCW 3 x 10   Sidelying shoulder abduction to 90 3# 3x12    standing robots with yellow band 3x12  Foam roller wall slide with yellow band 3x12  Shoulder flexion full range with yelllow band at wrists 3x8 " (standing back at wall)  Shoulder IR/ER @ 90 flexion 4x10-12 green band  Shoulder @ 90 flexion overhead press green band pulling into IR  2x10  Shoulder @ 90 flexion overhead press green band pulling into ER  2x10      Marie received the following manual therapy techniques: Joint mobilizations and PROM were applied to the: left shoulder for 12 minutes, including:    PROM shoulder flexion/IR/ER  Inferior GHJ mobilizations grade III-IV  Cross body MET  STM to subscap        Education provided:   - HEP, current condition, PT PLAN      Assessment     Patient progressing cuff strengthening, less pain with passive motion just needs to improve strength periscapular. She has minimal pain outside the clinic, just notes the weakness with functional lifting of her 60# daughter    Marie Is progressing well towards her goals.   Pt prognosis is Good.     Pt will continue to benefit from skilled outpatient physical therapy to address the deficits listed in the problem list box on initial evaluation, provide pt/family education and to maximize pt's level of independence in the home and community environment.     Pt's spiritual, cultural and educational needs considered and pt agreeable to plan of care and goals.    Anticipated barriers to physical therapy: none    Goals:   Short term goals: 2 weeks  Patient will become independent in HEP for maximal gains made in PT. - MET  Patient to improve AROM flexion to 160 for improvements in reaching activities. - MET        Long term goals: 12 weeks  Patient to demo 180 degrees of AROM flexion for improved overhead tasks.   Patient will improve strength to 5/5 for improved performance of household duties.  Patient will lift and carry 65 lb for safe transfer of daughter.    Plan     Focus on obtaining full ROM and maximize scapular and RC strength. Transfer care to Jeferson Persaud, PT, DPT.    Jeferson Persaud, PT , DPT, OCS

## 2023-11-01 ENCOUNTER — CLINICAL SUPPORT (OUTPATIENT)
Dept: REHABILITATION | Facility: HOSPITAL | Age: 48
End: 2023-11-01
Payer: MEDICAID

## 2023-11-01 DIAGNOSIS — M25.512 ACUTE PAIN OF LEFT SHOULDER: Primary | ICD-10-CM

## 2023-11-01 PROCEDURE — 97110 THERAPEUTIC EXERCISES: CPT | Performed by: PHYSICAL THERAPIST

## 2023-11-02 NOTE — PROGRESS NOTES
"  Physical Therapy Daily Treatment Note     Name: Marie Taylor  Clinic Number: [id number]    Therapy Diagnosis:   Encounter Diagnosis   Name Primary?    Acute pain of left shoulder Yes     Physician: Reji Domínguez MD    Visit Date: 11/1/2023  Physician Orders: PT Eval and Treat   Medical Diagnosis from Referral: M75.01 (ICD-10-CM) - Adhesive capsulitis of right shoulder  Evaluation Date: 3/8/2023  Authorization Period Expiration: 12/31/2023  Plan of Care Expiration: 6/8/2023  Visit # / Visits authorized: 26 / 40     Time In: 1100  Time Out: 1158  Total Appointment Time (timed & untimed codes): 58 minutes     Precautions: Standard    Subjective     Pt reports: No pain on arrival. Did some of my exercises this week  She was compliant with home exercise program.  Response to previous treatment: mild soreness  Functional change: improved overhead movement    Pain: 0/10  Location: left shoulder      Objective   Extender used: trained physical therapy technician    Shoulder flexion PROM pre-session: 178 degrees  Shoulder flexion PROM post-session: 180 degrees    Marie received therapeutic activities to develop strength, endurance, ROM, flexibility, and posture for 44 minutes including:    Bruggers YTB 3x to fatigue  UBE 8' for shoulder ROM and cardio endurance training  Sidelying shoulder ER 2# 3x12  Scaptions 2# 3 x 12  Serratus wall slides YTB 3 x 10  Serratus press 6# 30x    NT  Supine ER OTB hooked on shoe 3 x 15  Supine IR OTB 3 x 15  No money GTB 3 x 12 3"  Wall clock YTB 3 x 12  Pushup shoulder taps 3 x 12 B   Row with ER GTB 3 x 12  Standing T OTB 3 x 15  Standing Y OTB 3 x 15  Genie IR GTB 3 x 15  Cross body serratus press GTB 3 x 12  Prone extension 3# 3 x 15  Prone row 5# 3 x 15  Prone mid trap retraining 8x 8"  -reps 2 x 8  Swiss ball on wall CW/CCW 3 x 10   Sidelying shoulder abduction to 90 3# 3x12    standing robots with yellow band 3x12  Foam roller wall slide with yellow band 3x12  Shoulder " flexion full range with yelllow band at wrists 3x8 (standing back at wall)  Shoulder IR/ER @ 90 flexion 4x10-12 green band  Shoulder @ 90 flexion overhead press green band pulling into IR  2x10  Shoulder @ 90 flexion overhead press green band pulling into ER  2x10      Marie received the following manual therapy techniques: Joint mobilizations and PROM were applied to the: left shoulder for 12 minutes, including:    PROM shoulder flexion/IR/ER  Inferior GHJ mobilizations grade III-IV  Cross body MET  STM to subscap          Education provided:   - HEP, current condition, PT PLAN      Assessment     Patient was pain free on arrival, lacking end range strength Pain free but fatigues within session. Changed her HEP to eliminate bicep curl and focus more on functional reach and cuff strengthening    Marie Is progressing well towards her goals.   Pt prognosis is Good.     Pt will continue to benefit from skilled outpatient physical therapy to address the deficits listed in the problem list box on initial evaluation, provide pt/family education and to maximize pt's level of independence in the home and community environment.     Pt's spiritual, cultural and educational needs considered and pt agreeable to plan of care and goals.    Anticipated barriers to physical therapy: none    Goals:   Short term goals: 2 weeks  Patient will become independent in HEP for maximal gains made in PT. - MET  Patient to improve AROM flexion to 160 for improvements in reaching activities. - MET        Long term goals: 12 weeks  Patient to demo 180 degrees of AROM flexion for improved overhead tasks.   Patient will improve strength to 5/5 for improved performance of household duties.  Patient will lift and carry 65 lb for safe transfer of daughter.    Plan     Focus on obtaining full ROM and maximize scapular and RC strength. Transfer care to Jeferson Persaud, PT, DPT.    Jeferson Persaud, PT , DPT, OCS

## 2023-11-08 ENCOUNTER — CLINICAL SUPPORT (OUTPATIENT)
Dept: REHABILITATION | Facility: HOSPITAL | Age: 48
End: 2023-11-08
Payer: COMMERCIAL

## 2023-11-08 DIAGNOSIS — M25.512 ACUTE PAIN OF LEFT SHOULDER: Primary | ICD-10-CM

## 2023-11-08 PROCEDURE — 97110 THERAPEUTIC EXERCISES: CPT | Performed by: PHYSICAL THERAPIST

## 2023-11-09 NOTE — PROGRESS NOTES
"  Physical Therapy Daily Treatment Note     Name: Marie Taylor  Clinic Number: [id number]    Therapy Diagnosis:   Encounter Diagnosis   Name Primary?    Acute pain of left shoulder Yes     Physician: Reji Domínguez MD    Visit Date: 11/8/2023  Physician Orders: PT Eval and Treat   Medical Diagnosis from Referral: M75.01 (ICD-10-CM) - Adhesive capsulitis of right shoulder  Evaluation Date: 3/8/2023  Authorization Period Expiration: 12/31/2023  Plan of Care Expiration: 6/8/2023  Visit # / Visits authorized: 27 / 40     Time In: 1100  Time Out: 1158  Total Appointment Time (timed & untimed codes): 58 minutes     Precautions: Standard    Subjective     Pt reports: It hurts more this week, unsure the cause but its right over the front of the shoulder  She was compliant with home exercise program.  Response to previous treatment: mild soreness  Functional change: improved overhead movement    Pain: 0/10  Location: left shoulder      Objective   Extender used: trained physical therapy technician    Shoulder flexion PROM pre-session: 178 degrees  Shoulder flexion PROM post-session: 180 degrees    Marie received therapeutic activities to develop strength, endurance, ROM, flexibility, and posture for 46 minutes including:    UBE 8' for shoulder ROM and cardio endurance training  Sidelying shoulder ER 2# 3x12  IR GTB 3 x 15  Supine ER rhythmic stab GTB 3x to fatigue  Supine IR rhythmic stab GTB 3x to fatigue  Robbers GTB 3 x 12 3" hold  Scap ext step back 20 3"        NT  Scaptions 2# 3 x 12  Serratus wall slides YTB 3 x 10  Serratus press 6# 30x  Bruggers YTB 3x to fatigue  Supine ER OTB hooked on shoe 3 x 15  Supine IR OTB 3 x 15  No money GTB 3 x 12 3"  Wall clock YTB 3 x 12  Pushup shoulder taps 3 x 12 B   Row with ER GTB 3 x 12  Standing T OTB 3 x 15  Standing Y OTB 3 x 15  Genie IR GTB 3 x 15  Cross body serratus press GTB 3 x 12  Prone extension 3# 3 x 15  Prone row 5# 3 x 15  Prone mid trap retraining 8x " "8"  -reps 2 x 8  Swiss ball on wall CW/CCW 3 x 10   Sidelying shoulder abduction to 90 3# 3x12    standing robots with yellow band 3x12  Foam roller wall slide with yellow band 3x12  Shoulder flexion full range with yelllow band at wrists 3x8 (standing back at wall)  Shoulder IR/ER @ 90 flexion 4x10-12 green band  Shoulder @ 90 flexion overhead press green band pulling into IR  2x10  Shoulder @ 90 flexion overhead press green band pulling into ER  2x10      Marie received the following manual therapy techniques: Joint mobilizations and PROM were applied to the: left shoulder for 12 minutes, including:    PROM shoulder flexion/IR/ER  Inferior GHJ mobilizations grade III-IV  Cross body MET  STM to subscap          Education provided:   - HEP, current condition, PT PLAN      Assessment     Patient notes increased pain today on arrival, but noted she lacked cross body and posterior capsule mobility. Improved symptoms with ER not causing as much shearing after stretching capsule and provided centering glide. Continues to lack subscap strength needed.     Marie Is progressing well towards her goals.   Pt prognosis is Good.     Pt will continue to benefit from skilled outpatient physical therapy to address the deficits listed in the problem list box on initial evaluation, provide pt/family education and to maximize pt's level of independence in the home and community environment.     Pt's spiritual, cultural and educational needs considered and pt agreeable to plan of care and goals.    Anticipated barriers to physical therapy: none    Goals:   Short term goals: 2 weeks  Patient will become independent in HEP for maximal gains made in PT. - MET  Patient to improve AROM flexion to 160 for improvements in reaching activities. - MET        Long term goals: 12 weeks  Patient to demo 180 degrees of AROM flexion for improved overhead tasks.   Patient will improve strength to 5/5 for improved performance of household " duties.  Patient will lift and carry 65 lb for safe transfer of daughter.    Plan     Focus on obtaining full ROM and maximize scapular and RC strength. Transfer care to Jeferson Persaud PT, DPT.    Jeferson Persaud PT , DPT, OCS

## 2023-11-15 ENCOUNTER — CLINICAL SUPPORT (OUTPATIENT)
Dept: REHABILITATION | Facility: HOSPITAL | Age: 48
End: 2023-11-15
Payer: MEDICAID

## 2023-11-15 DIAGNOSIS — M25.512 ACUTE PAIN OF LEFT SHOULDER: Primary | ICD-10-CM

## 2023-11-15 PROCEDURE — 97110 THERAPEUTIC EXERCISES: CPT | Performed by: PHYSICAL THERAPIST

## 2023-11-15 NOTE — PROGRESS NOTES
"  Physical Therapy Daily Treatment Note     Name: Marie Taylor  Clinic Number: [id number]    Therapy Diagnosis:   Encounter Diagnosis   Name Primary?    Acute pain of left shoulder Yes     Physician: Reji Domínguez MD    Visit Date: 11/15/2023  Physician Orders: PT Eval and Treat   Medical Diagnosis from Referral: M75.01 (ICD-10-CM) - Adhesive capsulitis of right shoulder  Evaluation Date: 3/8/2023  Authorization Period Expiration: 12/31/2023  Plan of Care Expiration: 6/8/2023  Visit # / Visits authorized: 28 / 40     Time In: 1103  Time Out: 1159  Total Appointment Time (timed & untimed codes): 56 minutes     Precautions: Standard    Subjective     Pt reports: It continues to hurt more this past week but I have been doing my exercises    She was compliant with home exercise program.  Response to previous treatment: mild soreness  Functional change: improved overhead movement    Pain: 0/10  Location: left shoulder      Objective   Extender used: trained physical therapy technician    Shoulder flexion PROM pre-session: 178 degrees  Shoulder flexion PROM post-session: 180 degrees    Marie received therapeutic activities to develop strength, endurance, ROM, flexibility, and posture for 42 minutes including:    Sidelying shoulder ER 2# 3x12  ER with lift GTB 3 x 12  Prone extension 2# 3 x 15      NT  Supine ER rhythmic stab GTB 3x to fatigue  Supine IR rhythmic stab GTB 3x to fatigue  Robbers GTB 3 x 12 3" hold  Scap ext step back 20 3"  IR GTB 3 x 15  UBE 8' for shoulder ROM and cardio endurance training  Scaptions 2# 3 x 12  Serratus wall slides YTB 3 x 10      standing robots with yellow band 3x12  Foam roller wall slide with yellow band 3x12  Shoulder flexion full range with yelllow band at wrists 3x8 (standing back at wall)  Shoulder IR/ER @ 90 flexion 4x10-12 green band  Shoulder @ 90 flexion overhead press green band pulling into IR  2x10  Shoulder @ 90 flexion overhead press green band pulling into ER  " 2x10      Marie received the following manual therapy techniques: Joint mobilizations and PROM were applied to the: left shoulder for 18 minutes, including:    PROM shoulder flexion/IR/ER  Inferior GHJ mobilizations grade III-IV  Cross body MET  STM to subscap   Rhythmic stab IR/ER       Education provided:   - HEP, current condition, PT PLAN      Assessment     Cuff strength limited, reassessed at the start of the session. Increased total arc of motion, lacks end range stabilization to cuff. Needs to continue HEP provided last week. S/S consistent with cuff impingement    Marie Is progressing well towards her goals.   Pt prognosis is Good.     Pt will continue to benefit from skilled outpatient physical therapy to address the deficits listed in the problem list box on initial evaluation, provide pt/family education and to maximize pt's level of independence in the home and community environment.     Pt's spiritual, cultural and educational needs considered and pt agreeable to plan of care and goals.    Anticipated barriers to physical therapy: none    Goals:   Short term goals: 2 weeks  Patient will become independent in HEP for maximal gains made in PT. - MET  Patient to improve AROM flexion to 160 for improvements in reaching activities. - MET        Long term goals: 12 weeks  Patient to demo 180 degrees of AROM flexion for improved overhead tasks.   Patient will improve strength to 5/5 for improved performance of household duties.  Patient will lift and carry 65 lb for safe transfer of daughter.    Plan     Focus on obtaining full ROM and maximize scapular and RC strength. Transfer care to Jeferson Persaud, PT, DPT.    Jeferson Persaud, PT , DPT, OCS

## 2023-11-22 ENCOUNTER — CLINICAL SUPPORT (OUTPATIENT)
Dept: REHABILITATION | Facility: HOSPITAL | Age: 48
End: 2023-11-22
Payer: COMMERCIAL

## 2023-11-22 DIAGNOSIS — M25.512 ACUTE PAIN OF LEFT SHOULDER: Primary | ICD-10-CM

## 2023-11-22 PROCEDURE — 97530 THERAPEUTIC ACTIVITIES: CPT | Performed by: PHYSICAL THERAPIST

## 2023-11-22 PROCEDURE — 97140 MANUAL THERAPY 1/> REGIONS: CPT | Performed by: PHYSICAL THERAPIST

## 2023-11-22 NOTE — PROGRESS NOTES
"  Physical Therapy Daily Treatment Note     Name: Marie Taylor  Clinic Number: [id number]    Therapy Diagnosis:   Encounter Diagnosis   Name Primary?    Acute pain of left shoulder Yes     Physician: Reji Domínguez MD    Visit Date: 11/22/2023  Physician Orders: PT Eval and Treat   Medical Diagnosis from Referral: M75.01 (ICD-10-CM) - Adhesive capsulitis of right shoulder  Evaluation Date: 3/8/2023  Authorization Period Expiration: 12/31/2023  Plan of Care Expiration: 6/8/2023  Visit # / Visits authorized: 29 / 40     Time In: 1103  Time Out: 1159  Total Appointment Time (timed & untimed codes): 56 minutes     Precautions: Standard    Subjective     Pt reports: No pain this week, doing much better this week and been doing my exercises.     She was compliant with home exercise program.  Response to previous treatment: mild soreness  Functional change: improved overhead movement    Pain: 0/10  Location: left shoulder      Objective   Extender used: trained physical therapy technician    Shoulder flexion PROM pre-session: 178 degrees  Shoulder flexion PROM post-session: 180 degrees    Marie received therapeutic activities to develop strength, endurance, ROM, flexibility, and posture for 35 minutes including:    UBE 8' forward/backward level 6  Sidelying shoulder ER 2# 3x12  ER with lift OTB 3 x 12  Prone extension 3# 3 x 15    NT  Supine ER rhythmic stab GTB 3x to fatigue  Supine IR rhythmic stab GTB 3x to fatigue  Robbers GTB 3 x 12 3" hold  Scap ext step back 20 3"  IR GTB 3 x 15  UBE 8' for shoulder ROM and cardio endurance training  Scaptions 2# 3 x 12  Serratus wall slides YTB 3 x 10  standing robots with yellow band 3x12  Foam roller wall slide with yellow band 3x12  Shoulder flexion full range with yelllow band at wrists 3x8 (standing back at wall)  Shoulder IR/ER @ 90 flexion 4x10-12 green band  Shoulder @ 90 flexion overhead press green band pulling into IR  2x10  Shoulder @ 90 flexion overhead press " green band pulling into ER  2x10      Marie received the following manual therapy techniques: Joint mobilizations and PROM were applied to the: left shoulder for 25 minutes, including:    PROM shoulder flexion/IR/ER  Inferior GHJ mobilizations grade III-IV  Cross body MET  STM to subscap   Rhythmic stab IR/ER       Education provided:   - HEP, current condition, PT PLAN      Assessment     Progressing with cuff strengthening pain free range. The joint capsule moved much better today, continues to have excellent ROM and less impingement symptoms. Able to progress prone extensions today and rhythmic stab with PT    Marie Is progressing well towards her goals.   Pt prognosis is Good.     Pt will continue to benefit from skilled outpatient physical therapy to address the deficits listed in the problem list box on initial evaluation, provide pt/family education and to maximize pt's level of independence in the home and community environment.     Pt's spiritual, cultural and educational needs considered and pt agreeable to plan of care and goals.    Anticipated barriers to physical therapy: none    Goals:   Short term goals: 2 weeks  Patient will become independent in HEP for maximal gains made in PT. - MET  Patient to improve AROM flexion to 160 for improvements in reaching activities. - MET        Long term goals: 12 weeks  Patient to demo 180 degrees of AROM flexion for improved overhead tasks.   Patient will improve strength to 5/5 for improved performance of household duties.  Patient will lift and carry 65 lb for safe transfer of daughter.    Plan     Focus on obtaining full ROM and maximize scapular and RC strength. Transfer care to Jeferson Persaud, PT, DPT.    Jeferson Persaud, PT , DPT, OCS

## 2023-11-29 ENCOUNTER — CLINICAL SUPPORT (OUTPATIENT)
Dept: REHABILITATION | Facility: HOSPITAL | Age: 48
End: 2023-11-29
Payer: MEDICAID

## 2023-11-29 DIAGNOSIS — M25.512 ACUTE PAIN OF LEFT SHOULDER: Primary | ICD-10-CM

## 2023-11-29 PROCEDURE — 97140 MANUAL THERAPY 1/> REGIONS: CPT | Performed by: PHYSICAL THERAPIST

## 2023-11-29 PROCEDURE — 97530 THERAPEUTIC ACTIVITIES: CPT | Performed by: PHYSICAL THERAPIST

## 2023-11-29 NOTE — PROGRESS NOTES
"  Physical Therapy Daily Treatment Note     Name: Marie Taylor  Clinic Number: [id number]    Therapy Diagnosis:   Encounter Diagnosis   Name Primary?    Acute pain of left shoulder Yes     Physician: Reji Domínguez MD    Visit Date: 11/29/2023  Physician Orders: PT Eval and Treat   Medical Diagnosis from Referral: M75.01 (ICD-10-CM) - Adhesive capsulitis of right shoulder  Evaluation Date: 3/8/2023  Authorization Period Expiration: 12/31/2023  Plan of Care Expiration: 6/8/2023  Visit # / Visits authorized: 30 / 40     Time In: 1107  Time Out: 1205  Total Appointment Time (timed & untimed codes): 58 minutes     Precautions: Standard    Subjective     Pt reports: No pain just a little stiffness. Better about doing my exercises. Notes her daughter gained 13 lbs so possible cause of pain with transferring     She was compliant with home exercise program.  Response to previous treatment: less pain  Functional change: improved overhead movement    Pain: 0/10  Location: left shoulder      Objective   Extender used: trained physical therapy technician    Shoulder flexion PROM pre-session: 178 degrees  Shoulder flexion PROM post-session: 180 degrees    Marie received therapeutic activities to develop strength, endurance, ROM, flexibility, and posture for 33 minutes including:    UBE 8' forward/backward level 6  Sidelying shoulder ER 2# 3x12  No money press OTB 3 x 15  Standing on GTB scaptions 3 x 15  Prone extensions 3# 3 x 15    NT  ER with lift OTB 3 x 12  Prone extension 3# 3 x 15  Supine ER rhythmic stab GTB 3x to fatigue  Supine IR rhythmic stab GTB 3x to fatigue  Robbers GTB 3 x 12 3" hold  Scap ext step back 20 3"  IR GTB 3 x 15  UBE 8' for shoulder ROM and cardio endurance training  Scaptions 2# 3 x 12  Serratus wall slides YTB 3 x 10  standing robots with yellow band 3x12  Foam roller wall slide with yellow band 3x12  Shoulder flexion full range with yelllow band at wrists 3x8 (standing back at " wall)  Shoulder IR/ER @ 90 flexion 4x10-12 green band  Shoulder @ 90 flexion overhead press green band pulling into IR  2x10  Shoulder @ 90 flexion overhead press green band pulling into ER  2x10      Marie received the following manual therapy techniques: Joint mobilizations and PROM were applied to the: left shoulder for 25 minutes, including:    PROM shoulder flexion/IR/ER  Inferior GHJ mobilizations grade III-IV  Cross body MET  STM to subscap   Rhythmic stab IR/ER       Education provided:   - HEP, current condition, PT PLAN      Assessment     Progressed cuff activation with press away from body and increased weight with extensions for mid/low trap. Eccentric lowering with scaptions well controlled without UT dominance.  Of note: patient will be switching insurances next year and concerned with getting visits covered    Marie Is progressing well towards her goals.   Pt prognosis is Good.     Pt will continue to benefit from skilled outpatient physical therapy to address the deficits listed in the problem list box on initial evaluation, provide pt/family education and to maximize pt's level of independence in the home and community environment.     Pt's spiritual, cultural and educational needs considered and pt agreeable to plan of care and goals.    Anticipated barriers to physical therapy: none    Goals:   Short term goals: 2 weeks  Patient will become independent in HEP for maximal gains made in PT. - MET  Patient to improve AROM flexion to 160 for improvements in reaching activities. - MET        Long term goals: 12 weeks  Patient to demo 180 degrees of AROM flexion for improved overhead tasks.   Patient will improve strength to 5/5 for improved performance of household duties.  Patient will lift and carry 65 lb for safe transfer of daughter.    Plan     Focus on obtaining full ROM and maximize scapular and RC strength. Transfer care to Jeferson Persaud, PT, DPT.    Jeferson Persaud, PT , DPT, OCS

## 2023-12-06 ENCOUNTER — CLINICAL SUPPORT (OUTPATIENT)
Dept: REHABILITATION | Facility: HOSPITAL | Age: 48
End: 2023-12-06
Payer: COMMERCIAL

## 2023-12-06 DIAGNOSIS — M25.512 ACUTE PAIN OF LEFT SHOULDER: Primary | ICD-10-CM

## 2023-12-06 PROCEDURE — 97140 MANUAL THERAPY 1/> REGIONS: CPT | Performed by: PHYSICAL THERAPIST

## 2023-12-06 PROCEDURE — 97530 THERAPEUTIC ACTIVITIES: CPT | Performed by: PHYSICAL THERAPIST

## 2023-12-06 NOTE — PROGRESS NOTES
"  Physical Therapy Daily Treatment Note     Name: Marie Taylor  Clinic Number: [id number]    Therapy Diagnosis:   Encounter Diagnosis   Name Primary?    Acute pain of left shoulder Yes     Physician: Reji Domínguez MD    Visit Date: 12/6/2023  Physician Orders: PT Eval and Treat   Medical Diagnosis from Referral: M75.01 (ICD-10-CM) - Adhesive capsulitis of right shoulder  Evaluation Date: 3/8/2023  Authorization Period Expiration: 12/31/2023  Plan of Care Expiration: 6/8/2023  Visit # / Visits authorized: 31 / 40     Time In: 1115  Time Out: 1215  Total Appointment Time (timed & untimed codes): 60 minutes     Precautions: Standard    Subjective     Pt reports: Another good week with the shoulder    She was compliant with home exercise program.  Response to previous treatment: less pain  Functional change: improved overhead movement    Pain: 0/10  Location: left shoulder      Objective   Extender used: trained physical therapy technician    Shoulder flexion PROM pre-session: 178 degrees  Shoulder flexion PROM post-session: 180 degrees    Marie received therapeutic activities to develop strength, endurance, ROM, flexibility, and posture for 35 minutes including:    UBE 8' forward/backward level 6  Sidelying shoulder ER 2# 3x12  No money press GTB 3 x 15  Supine IR GTB 2 x 15    NT  Standing on GTB scaptions 3 x 15  Prone extensions 3# 3 x 15  ER with lift OTB 3 x 12  Prone extension 3# 3 x 15  Supine ER rhythmic stab GTB 3x to fatigue  Supine IR rhythmic stab GTB 3x to fatigue  Robbers GTB 3 x 12 3" hold  Scap ext step back 20 3"  IR GTB 3 x 15  UBE 8' for shoulder ROM and cardio endurance training  Scaptions 2# 3 x 12  Serratus wall slides YTB 3 x 10  standing robots with yellow band 3x12  Foam roller wall slide with yellow band 3x12  Shoulder flexion full range with yelllow band at wrists 3x8 (standing back at wall)  Shoulder IR/ER @ 90 flexion 4x10-12 green band  Shoulder @ 90 flexion overhead press green " band pulling into IR  2x10  Shoulder @ 90 flexion overhead press green band pulling into ER  2x10      Marie received the following manual therapy techniques: Joint mobilizations and PROM were applied to the: left shoulder for 25 minutes, including:    PROM shoulder flexion/IR/ER  Inferior GHJ mobilizations grade III-IV  Cross body MET  STM to subscap   Rhythmic stab IR/ER       Education provided:   - HEP, current condition, PT PLAN      Assessment     Limited IR and posterior cuff mobility at start, pain with manual. Got relief with MET and mobilizations start and finish session. Improved cuff strength noted with 2# ER, progressing to GTB resistance.     Marie Is progressing well towards her goals.   Pt prognosis is Good.     Pt will continue to benefit from skilled outpatient physical therapy to address the deficits listed in the problem list box on initial evaluation, provide pt/family education and to maximize pt's level of independence in the home and community environment.     Pt's spiritual, cultural and educational needs considered and pt agreeable to plan of care and goals.    Anticipated barriers to physical therapy: none    Goals:   Short term goals: 2 weeks  Patient will become independent in HEP for maximal gains made in PT. - MET  Patient to improve AROM flexion to 160 for improvements in reaching activities. - MET        Long term goals: 12 weeks  Patient to demo 180 degrees of AROM flexion for improved overhead tasks.   Patient will improve strength to 5/5 for improved performance of household duties.  Patient will lift and carry 65 lb for safe transfer of daughter.    Plan     Focus on obtaining full ROM and maximize scapular and RC strength. Transfer care to Jeferson Persaud, PT, DPT.    Jeferson Persaud, PT , DPT, OCS

## 2023-12-13 ENCOUNTER — CLINICAL SUPPORT (OUTPATIENT)
Dept: REHABILITATION | Facility: HOSPITAL | Age: 48
End: 2023-12-13
Payer: COMMERCIAL

## 2023-12-13 DIAGNOSIS — M25.512 ACUTE PAIN OF LEFT SHOULDER: Primary | ICD-10-CM

## 2023-12-13 PROCEDURE — 97140 MANUAL THERAPY 1/> REGIONS: CPT | Performed by: PHYSICAL THERAPIST

## 2023-12-13 PROCEDURE — 97530 THERAPEUTIC ACTIVITIES: CPT | Performed by: PHYSICAL THERAPIST

## 2023-12-13 NOTE — PROGRESS NOTES
"  Physical Therapy Daily Treatment Note     Name: Marie Taylor  Clinic Number: [id number]    Therapy Diagnosis:   Encounter Diagnosis   Name Primary?    Acute pain of left shoulder Yes     Physician: Reji Domínguez MD    Visit Date: 12/13/2023  Physician Orders: PT Eval and Treat   Medical Diagnosis from Referral: M75.01 (ICD-10-CM) - Adhesive capsulitis of right shoulder  Evaluation Date: 3/8/2023  Authorization Period Expiration: 12/31/2023  Plan of Care Expiration: 6/8/2023  Visit # / Visits authorized: 32 / 40     Time In: 1100  Time Out: 1200  Total Appointment Time (timed & untimed codes): 60 minutes     Precautions: Standard    Subjective     Pt reports: No pain, just some stiffness in the shoulder right now. Another good week for the shoulder    She was compliant with home exercise program.  Response to previous treatment: less pain  Functional change: improved overhead movement    Pain: 0/10  Location: left shoulder      Objective   Extender used: trained physical therapy technician    Shoulder flexion PROM pre-session: 178 degrees  Shoulder flexion PROM post-session: 180 degrees    Marie received therapeutic activities to develop strength, endurance, ROM, flexibility, and posture for 35 minutes including:    UBE 10' forward/backward level 6  Sidelying shoulder ER 2# 3x12  Prone extensions 2# 3 x 15  Prone IR 2# 3 x 15  Supine ER rhythmic stab GTB 3x to fatigue  Supine IR rhythmic stab GTB 3x to fatigue    NT  No money press GTB 3 x 15  Supine IR GTB 2 x 15  Standing on GTB scaptions 3 x 15  ER with lift OTB 3 x 12  Prone extension 3# 3 x 15  Robbers GTB 3 x 12 3" hold  Scap ext step back 20 3"  IR GTB 3 x 15  Scaptions 2# 3 x 12  Serratus wall slides YTB 3 x 10  standing robots with yellow band 3x12  Foam roller wall slide with yellow band 3x12  Shoulder flexion full range with yelllow band at wrists 3x8 (standing back at wall)  Shoulder IR/ER @ 90 flexion 4x10-12 green band  Shoulder @ 90 flexion " overhead press green band pulling into IR  2x10  Shoulder @ 90 flexion overhead press green band pulling into ER  2x10      Marie received the following manual therapy techniques: Joint mobilizations and PROM were applied to the: left shoulder for 25 minutes, including:    PROM shoulder flexion/IR/ER  Inferior GHJ mobilizations grade III-IV  Cross body MET  STM to subscap   Rhythmic stab IR/ER       Education provided:   - HEP, current condition, PT PLAN      Assessment     Progressed prone loading to subscap today. Noted some discomfort end range flexion but passive arc of motion was full today. Cuff strength is improving and her IR/ER strength has improved with loading. Relief with inferior mob     Marie Is progressing well towards her goals.   Pt prognosis is Good.     Pt will continue to benefit from skilled outpatient physical therapy to address the deficits listed in the problem list box on initial evaluation, provide pt/family education and to maximize pt's level of independence in the home and community environment.     Pt's spiritual, cultural and educational needs considered and pt agreeable to plan of care and goals.    Anticipated barriers to physical therapy: none    Goals:   Short term goals: 2 weeks  Patient will become independent in HEP for maximal gains made in PT. - MET  Patient to improve AROM flexion to 160 for improvements in reaching activities. - MET        Long term goals: 12 weeks  Patient to demo 180 degrees of AROM flexion for improved overhead tasks.   Patient will improve strength to 5/5 for improved performance of household duties.  Patient will lift and carry 65 lb for safe transfer of daughter.    Plan     Focus on obtaining full ROM and maximize scapular and RC strength. Transfer care to Jeferson Persaud, PT, DPT.    Jeferson Persaud, PT , DPT, OCS

## 2023-12-20 ENCOUNTER — CLINICAL SUPPORT (OUTPATIENT)
Dept: REHABILITATION | Facility: HOSPITAL | Age: 48
End: 2023-12-20
Payer: COMMERCIAL

## 2023-12-20 DIAGNOSIS — M25.512 ACUTE PAIN OF LEFT SHOULDER: Primary | ICD-10-CM

## 2023-12-20 PROCEDURE — 97140 MANUAL THERAPY 1/> REGIONS: CPT | Performed by: PHYSICAL THERAPIST

## 2023-12-20 PROCEDURE — 97112 NEUROMUSCULAR REEDUCATION: CPT | Performed by: PHYSICAL THERAPIST

## 2023-12-20 NOTE — PROGRESS NOTES
"  Physical Therapy Daily Treatment Note     Name: Marie Taylor  Clinic Number: [id number]    Therapy Diagnosis:   Encounter Diagnosis   Name Primary?    Acute pain of left shoulder Yes     Physician: Reji Domínguez MD    Visit Date: 12/20/2023  Physician Orders: PT Eval and Treat   Medical Diagnosis from Referral: M75.01 (ICD-10-CM) - Adhesive capsulitis of right shoulder  Evaluation Date: 3/8/2023  Authorization Period Expiration: 12/31/2023  Plan of Care Expiration: 6/8/2023  Visit # / Visits authorized: 33 / 40     Time In: 1100  Time Out: 1200  Total Appointment Time (timed & untimed codes): 60 minutes     Precautions: Standard    Subjective     Pt reports: Another good week. Ready for [place] and traveling to [place] next week. Consistent with her HEP    She was compliant with home exercise program.  Response to previous treatment: less pain  Functional change: improved overhead movement    Pain: 0/10  Location: left shoulder      Objective   Extender used: trained physical therapy technician    Shoulder flexion PROM pre-session: 178 degrees  Shoulder flexion PROM post-session: 180 degrees    Marie received therapeutic activities to develop strength, endurance, ROM, flexibility, and posture for 35 minutes including:    Supine ER with LE kickout GTB 3 x 15  Supine upside down KB 5# serratus press 3 x 15  Serratus wall slides YTB 3  x12  ER no money GTB 20x 3" hold    NT  Sidelying shoulder ER 2# 3x12  Prone extensions 2# 3 x 15  Prone IR 2# 3 x 15  Supine ER rhythmic stab GTB 3x to fatigue  Supine IR rhythmic stab GTB 3x to fatigue  UBE 10' forward/backward level 6  No money press GTB 3 x 15  Supine IR GTB 2 x 15  Standing on GTB scaptions 3 x 15  ER with lift OTB 3 x 12  Prone extension 3# 3 x 15  Robbers GTB 3 x 12 3" hold  Scap ext step back 20 3"  IR GTB 3 x 15  Scaptions 2# 3 x 12  Serratus wall slides YTB 3 x 10  standing robots with yellow band 3x12  Foam roller wall slide with yellow band " 3x12  Shoulder flexion full range with yelllow band at wrists 3x8 (standing back at wall)  Shoulder IR/ER @ 90 flexion 4x10-12 green band  Shoulder @ 90 flexion overhead press green band pulling into IR  2x10  Shoulder @ 90 flexion overhead press green band pulling into ER  2x10      Marie received the following manual therapy techniques: Joint mobilizations and PROM were applied to the: left shoulder for 25 minutes, including:    PROM shoulder flexion/IR/ER  Inferior GHJ mobilizations grade III-IV  Cross body MET  STM to subscap   Rhythmic stab IR/ER       Education provided:   - HEP, current condition, PT PLAN      Assessment     Improved ROM to the shoulder today on arrival, gets relief end range stretch and no pain. She progressed with serratus activation using KB as unsteady object and slides overhead to improve reaching. ER with increased resistance using GTB     Marie Is progressing well towards her goals.   Pt prognosis is Good.     Pt will continue to benefit from skilled outpatient physical therapy to address the deficits listed in the problem list box on initial evaluation, provide pt/family education and to maximize pt's level of independence in the home and community environment.     Pt's spiritual, cultural and educational needs considered and pt agreeable to plan of care and goals.    Anticipated barriers to physical therapy: none    Goals:   Short term goals: 2 weeks  Patient will become independent in HEP for maximal gains made in PT. - MET  Patient to improve AROM flexion to 160 for improvements in reaching activities. - MET        Long term goals: 12 weeks  Patient to demo 180 degrees of AROM flexion for improved overhead tasks.   Patient will improve strength to 5/5 for improved performance of household duties.  Patient will lift and carry 65 lb for safe transfer of daughter.    Plan     Focus on obtaining full ROM and maximize scapular and RC strength. Transfer care to Jeferson Persaud, PT,  DPT.    Jeferson Persaud, PT , DPT, OCS

## 2023-12-27 ENCOUNTER — CLINICAL SUPPORT (OUTPATIENT)
Dept: REHABILITATION | Facility: HOSPITAL | Age: 48
End: 2023-12-27
Payer: COMMERCIAL

## 2023-12-27 DIAGNOSIS — M25.512 ACUTE PAIN OF LEFT SHOULDER: Primary | ICD-10-CM

## 2023-12-27 PROCEDURE — 97140 MANUAL THERAPY 1/> REGIONS: CPT | Performed by: PHYSICAL THERAPIST

## 2023-12-27 PROCEDURE — 97530 THERAPEUTIC ACTIVITIES: CPT | Performed by: PHYSICAL THERAPIST

## 2023-12-27 NOTE — PROGRESS NOTES
"  Physical Therapy Daily Treatment Note     Name: Marie Taylor  Clinic Number: [id number]    Therapy Diagnosis:   Encounter Diagnosis   Name Primary?    Acute pain of left shoulder Yes     Physician: Reji Domínguez MD    Visit Date: 12/27/2023  Physician Orders: PT Eval and Treat   Medical Diagnosis from Referral: M75.01 (ICD-10-CM) - Adhesive capsulitis of right shoulder  Evaluation Date: 3/8/2023  Authorization Period Expiration: 12/31/2023  Plan of Care Expiration: 6/8/2023  Visit # / Visits authorized: 34 / 40     Time In: 1100  Time Out: 1200  Total Appointment Time (timed & untimed codes): 60 minutes     Precautions: Standard    Subjective     Pt reports: Not exercising this past week due to holiday and is leaving for [place] tomorrow    She was compliant with home exercise program.  Response to previous treatment: less pain  Functional change: improved overhead movement    Pain: 0/10  Location: left shoulder      Objective   Extender used: trained physical therapy technician    Shoulder flexion PROM pre-session: 178 degrees  Shoulder flexion PROM post-session: 180 degrees    Marie received therapeutic activities to develop strength, endurance, ROM, flexibility, and posture for 36 minutes including:    UBE 10' scap mobility and strengthening, level 4  Sidelying shoulder ER 2# 3x12  Supine IR/ER controlled motion 2# 3 x 15  Serratus wall slides YTB 3  x12  ER no money yellow loop 20x 3" hold    NT  Supine upside down KB 5# serratus press 3 x 15  Prone extensions 2# 3 x 15  Prone IR 2# 3 x 15  Supine ER rhythmic stab GTB 3x to fatigue  Supine IR rhythmic stab GTB 3x to fatigue  UBE 10' forward/backward level 6  No money press GTB 3 x 15  Supine IR GTB 2 x 15  Standing on GTB scaptions 3 x 15  ER with lift OTB 3 x 12  Prone extension 3# 3 x 15        Marie received the following manual therapy techniques: Joint mobilizations and PROM were applied to the: left shoulder for 24 minutes, " including:    PROM shoulder flexion/IR/ER  Inferior GHJ mobilizations grade III-IV  Cross body MET  STM to subscap   Rhythmic stab IR/ER       Education provided:   - HEP, current condition, PT PLAN      Assessment     Pain free arc of motion just slight pinch end range with resistance into flexion and. ER. Progressed serratus supine and at the wall. Continues to do well and tolerating progressions    Marie Is progressing well towards her goals.   Pt prognosis is Good.     Pt will continue to benefit from skilled outpatient physical therapy to address the deficits listed in the problem list box on initial evaluation, provide pt/family education and to maximize pt's level of independence in the home and community environment.     Pt's spiritual, cultural and educational needs considered and pt agreeable to plan of care and goals.    Anticipated barriers to physical therapy: none    Goals:   Short term goals: 2 weeks  Patient will become independent in HEP for maximal gains made in PT. - MET  Patient to improve AROM flexion to 160 for improvements in reaching activities. - MET        Long term goals: 12 weeks  Patient to demo 180 degrees of AROM flexion for improved overhead tasks.   Patient will improve strength to 5/5 for improved performance of household duties.  Patient will lift and carry 65 lb for safe transfer of daughter.    Plan     Focus on obtaining full ROM and maximize scapular and RC strength. Transfer care to Jeferson Persaud, PT, DPT.    Jeferson Persaud, PT , DPT, OCS

## 2024-01-03 ENCOUNTER — CLINICAL SUPPORT (OUTPATIENT)
Dept: REHABILITATION | Facility: HOSPITAL | Age: 49
End: 2024-01-03
Payer: COMMERCIAL

## 2024-01-03 DIAGNOSIS — M25.512 ACUTE PAIN OF LEFT SHOULDER: Primary | ICD-10-CM

## 2024-01-03 PROCEDURE — 97140 MANUAL THERAPY 1/> REGIONS: CPT | Performed by: PHYSICAL THERAPIST

## 2024-01-03 PROCEDURE — 97530 THERAPEUTIC ACTIVITIES: CPT | Performed by: PHYSICAL THERAPIST

## 2024-01-03 NOTE — PROGRESS NOTES
"  Physical Therapy Daily Treatment Note     Name: Marie Taylor  Clinic Number: [id number]    Therapy Diagnosis:   Encounter Diagnosis   Name Primary?    Acute pain of left shoulder Yes     Physician: Reji Domínguez MD    Visit Date: 1/3/2024  Physician Orders: PT Eval and Treat   Medical Diagnosis from Referral: M75.01 (ICD-10-CM) - Adhesive capsulitis of right shoulder  Evaluation Date: 3/8/2023  Authorization Period Expiration: 12/31/2024  Plan of Care Expiration: 6/8/2023  Visit # / Visits authorized: 1 / 20     Time In: 1100  Time Out: 1200  Total Appointment Time (timed & untimed codes): 60 minutes     Precautions: Standard    Subjective     Pt reports: Soreness from carrying bags in [place]    She was compliant with home exercise program.  Response to previous treatment: less pain  Functional change: improved overhead movement    Pain: 0/10  Location: left shoulder      Objective   Extender used: trained physical therapy technician    Shoulder flexion PROM pre-session: 178 degrees  Shoulder flexion PROM post-session: 180 degrees    Marie received therapeutic activities to develop strength, endurance, ROM, flexibility, and posture for 36 minutes including:    UBE 10' scap mobility and strengthening, level 4  Sidelying shoulder ER 1# 3x12  IR walkouts GTB 2 x 15  ER walkouts OTB 2 x 15  Pushup shoulder taps 20x ea    NT  Supine IR/ER controlled motion 2# 3 x 15  Serratus wall slides YTB 3  x12  ER no money yellow loop 20x 3" hold  Supine upside down KB 5# serratus press 3 x 15  Prone extensions 2# 3 x 15  Prone IR 2# 3 x 15  Supine ER rhythmic stab GTB 3x to fatigue    Marie received the following manual therapy techniques: Joint mobilizations and PROM were applied to the: left shoulder for 24 minutes, including:    PROM shoulder flexion/IR/ER  Inferior GHJ mobilizations grade III-IV  Cross body MET  STM to subscap   Rhythmic stab IR/ER       Education provided:   - HEP, current condition, PT " PLAN      Assessment     Patient progressed with strengthening to the rotator cuff today. More isometric walkouts due to soreness from carrying bags on her trip. Able to load in closed chain shoulder taps without pain and active flexion less painful end of session    Marie Is progressing well towards her goals.   Pt prognosis is Good.     Pt will continue to benefit from skilled outpatient physical therapy to address the deficits listed in the problem list box on initial evaluation, provide pt/family education and to maximize pt's level of independence in the home and community environment.     Pt's spiritual, cultural and educational needs considered and pt agreeable to plan of care and goals.    Anticipated barriers to physical therapy: none    Goals:   Short term goals: 2 weeks  Patient will become independent in HEP for maximal gains made in PT. - MET  Patient to improve AROM flexion to 160 for improvements in reaching activities. - MET        Long term goals: 12 weeks  Patient to demo 180 degrees of AROM flexion for improved overhead tasks.   Patient will improve strength to 5/5 for improved performance of household duties.  Patient will lift and carry 65 lb for safe transfer of daughter.    Plan     Focus on obtaining full ROM and maximize scapular and RC strength    Jeferson Persaud, PT , DPT, OCS

## 2024-01-24 ENCOUNTER — CLINICAL SUPPORT (OUTPATIENT)
Dept: REHABILITATION | Facility: HOSPITAL | Age: 49
End: 2024-01-24
Payer: COMMERCIAL

## 2024-01-24 DIAGNOSIS — M25.512 ACUTE PAIN OF LEFT SHOULDER: Primary | ICD-10-CM

## 2024-01-24 PROCEDURE — 97530 THERAPEUTIC ACTIVITIES: CPT | Performed by: PHYSICAL THERAPIST

## 2024-01-24 PROCEDURE — 97140 MANUAL THERAPY 1/> REGIONS: CPT | Performed by: PHYSICAL THERAPIST

## 2024-01-24 NOTE — PROGRESS NOTES
"  Physical Therapy Daily Treatment Note     Name: Marie Taylor  Clinic Number: [id number]    Therapy Diagnosis:   Encounter Diagnosis   Name Primary?    Acute pain of left shoulder Yes     Physician: Reji Domínguez MD    Visit Date: 1/24/2024  Physician Orders: PT Eval and Treat   Medical Diagnosis from Referral: M75.01 (ICD-10-CM) - Adhesive capsulitis of right shoulder  Evaluation Date: 3/8/2023  Authorization Period Expiration: 12/31/2024  Plan of Care Expiration: 6/8/2023  Visit # / Visits authorized: 2 / 20     Time In: 1055  Time Out: 1200  Total Appointment Time (timed & untimed codes): 65 minutes     Precautions: Standard    Subjective     Pt reports: It really hurt 2 weeks ago. Notes the pain was worse when helping babysit her daughter's friend     She was compliant with home exercise program.  Response to previous treatment: less pain  Functional change: improved overhead movement    Pain: 0/10  Location: left shoulder      Objective   Extender used: trained physical therapy technician    Shoulder flexion PROM pre-session: 178 degrees  Shoulder flexion PROM post-session: 180 degrees    Marie received therapeutic activities to develop strength, endurance, ROM, flexibility, and posture for 36 minutes including:    UBE 10' scap mobility and strengthening, level 4  Sidelying shoulder ER 1# 3x12  Serratus wall slides YTB 3  x12  ER no money red band with flexion 20x 3" hold    NT  IR walkouts GTB 2 x 15  ER walkouts OTB 2 x 15  Pushup shoulder taps 20x ea  Supine IR/ER controlled motion 2# 3 x 15  Supine upside down KB 5# serratus press 3 x 15  Prone extensions 2# 3 x 15  Prone IR 2# 3 x 15  Supine ER rhythmic stab GTB 3x to fatigue    Marie received the following manual therapy techniques: Joint mobilizations and PROM were applied to the: left shoulder for 24 minutes, including:    PROM shoulder flexion/IR/ER  Inferior GHJ mobilizations grade III-IV  Cross body MET  STM to subscap   Rhythmic stab " IR/ER       Education provided:   - HEP, current condition, PT PLAN      Assessment     Increased load on the shoulder a couple weeks ago causing pain. Today the joint was pain free just needs to improve her cuff strength and serratus/periscapular strength. Plan to progress to every other week next month    Marie Is progressing well towards her goals.   Pt prognosis is Good.     Pt will continue to benefit from skilled outpatient physical therapy to address the deficits listed in the problem list box on initial evaluation, provide pt/family education and to maximize pt's level of independence in the home and community environment.     Pt's spiritual, cultural and educational needs considered and pt agreeable to plan of care and goals.    Anticipated barriers to physical therapy: none    Goals:   Short term goals: 2 weeks  Patient will become independent in HEP for maximal gains made in PT. - MET  Patient to improve AROM flexion to 160 for improvements in reaching activities. - MET        Long term goals: 12 weeks  Patient to demo 180 degrees of AROM flexion for improved overhead tasks.   Patient will improve strength to 5/5 for improved performance of household duties.  Patient will lift and carry 65 lb for safe transfer of daughter.    Plan     Focus on obtaining full ROM and maximize scapular and RC strength    Jeferson Persaud, PT , DPT, OCS

## 2024-01-31 ENCOUNTER — CLINICAL SUPPORT (OUTPATIENT)
Dept: REHABILITATION | Facility: HOSPITAL | Age: 49
End: 2024-01-31
Payer: COMMERCIAL

## 2024-01-31 DIAGNOSIS — M25.512 ACUTE PAIN OF LEFT SHOULDER: Primary | ICD-10-CM

## 2024-01-31 PROCEDURE — 97140 MANUAL THERAPY 1/> REGIONS: CPT | Performed by: PHYSICAL THERAPIST

## 2024-01-31 PROCEDURE — 97530 THERAPEUTIC ACTIVITIES: CPT | Performed by: PHYSICAL THERAPIST

## 2024-02-01 NOTE — PROGRESS NOTES
"  Physical Therapy Daily Treatment Note     Name: Marie Taylor  Clinic Number: [id number]    Therapy Diagnosis:   Encounter Diagnosis   Name Primary?    Acute pain of left shoulder Yes     Physician: Reji Domínguez MD    Visit Date: 1/31/2024  Physician Orders: PT Eval and Treat   Medical Diagnosis from Referral: M75.01 (ICD-10-CM) - Adhesive capsulitis of right shoulder  Evaluation Date: 3/8/2023  Authorization Period Expiration: 12/31/2024  Plan of Care Expiration: 6/8/2023  Visit # / Visits authorized: 3 / 20     Time In: 1102  Time Out: 1200  Total Appointment Time (timed & untimed codes): 58 minutes     Precautions: Standard    Subjective     Pt reports: Better these two weeks since my daughter's friend went back home    She was compliant with home exercise program.  Response to previous treatment: less pain  Functional change: improved overhead movement    Pain: 0/10  Location: left shoulder      Objective   Extender used: trained physical therapy technician    Shoulder flexion PROM pre-session: 178 degrees  Shoulder flexion PROM post-session: 180 degrees    Marie received therapeutic activities to develop strength, endurance, ROM, flexibility, and posture for 34 minutes including:    Sidelying shoulder ER 1# 3x12  Overhead IR OTB subscap 3 x 15  ER with lift OTB 3 x 15    NT  Serratus wall slides YTB 3  x12  ER no money red band with flexion 20x 3" hold  UBE 10' scap mobility and strengthening, level 4  IR walkouts GTB 2 x 15  ER walkouts OTB 2 x 15  Pushup shoulder taps 20x ea  Supine IR/ER controlled motion 2# 3 x 15  Supine upside down KB 5# serratus press 3 x 15  Prone extensions 2# 3 x 15  Prone IR 2# 3 x 15  Supine ER rhythmic stab GTB 3x to fatigue    Marie received the following manual therapy techniques: Joint mobilizations and PROM were applied to the: left shoulder for 24 minutes, including:    PROM shoulder flexion/IR/ER  Inferior GHJ mobilizations grade III-IV  Cross body MET  STM to " subscap   Rhythmic stab IR/ER       Education provided:   - HEP, current condition, PT PLAN      Assessment     Progressed with subscap strengthening, only limited end range IR and posterior capsule mobility. Progressing cuff with lift. Will continue progressing within overhead ROM     Marie Is progressing well towards her goals.   Pt prognosis is Good.     Pt will continue to benefit from skilled outpatient physical therapy to address the deficits listed in the problem list box on initial evaluation, provide pt/family education and to maximize pt's level of independence in the home and community environment.     Pt's spiritual, cultural and educational needs considered and pt agreeable to plan of care and goals.    Anticipated barriers to physical therapy: none    Goals:   Short term goals: 2 weeks  Patient will become independent in HEP for maximal gains made in PT. - MET  Patient to improve AROM flexion to 160 for improvements in reaching activities. - MET        Long term goals: 12 weeks  Patient to demo 180 degrees of AROM flexion for improved overhead tasks.   Patient will improve strength to 5/5 for improved performance of household duties.  Patient will lift and carry 65 lb for safe transfer of daughter.    Plan     Focus on obtaining full ROM and maximize scapular and RC strength    Jeferson Persaud, PT , DPT, OCS

## 2024-02-07 ENCOUNTER — CLINICAL SUPPORT (OUTPATIENT)
Dept: REHABILITATION | Facility: HOSPITAL | Age: 49
End: 2024-02-07
Payer: COMMERCIAL

## 2024-02-07 DIAGNOSIS — M25.512 ACUTE PAIN OF LEFT SHOULDER: Primary | ICD-10-CM

## 2024-02-07 PROCEDURE — 97530 THERAPEUTIC ACTIVITIES: CPT | Performed by: PHYSICAL THERAPIST

## 2024-02-07 PROCEDURE — 97140 MANUAL THERAPY 1/> REGIONS: CPT | Performed by: PHYSICAL THERAPIST

## 2024-02-07 NOTE — PROGRESS NOTES
"  Physical Therapy Daily Treatment Note     Name: Marie Taylor  Clinic Number: [id number]    Therapy Diagnosis:   Encounter Diagnosis   Name Primary?    Acute pain of left shoulder Yes     Physician: Reji Domínguez MD    Visit Date: 2/7/2024  Physician Orders: PT Eval and Treat   Medical Diagnosis from Referral: M75.01 (ICD-10-CM) - Adhesive capsulitis of right shoulder  Evaluation Date: 3/8/2023  Authorization Period Expiration: 12/31/2024  Plan of Care Expiration: 6/8/2023  Visit # / Visits authorized: 4/ 20     Time In: 1105  Time Out: 1200  Total Appointment Time (timed & untimed codes): 55 minutes     Precautions: Standard    Subjective     Pt reports: Shoulder is feeling good right now, doing well the past week    She was compliant with home exercise program.  Response to previous treatment: less pain  Functional change: improved overhead movement    Pain: 0/10  Location: left shoulder      Objective   Extender used: trained physical therapy technician    Shoulder flexion PROM pre-session: 178 degrees  Shoulder flexion PROM post-session: 180 degrees    Marie received therapeutic activities to develop strength, endurance, ROM, flexibility, and posture for 36 minutes including:    UBE 10' scap mobility and strengthening, level 4  Sidelying shoulder ER 1# 3x12  ER step backs OTB 90/90 3 x 12  Scaptions 2# 3 x 12  Genie IR OTB 2 x 15 90/90 in front    NT  Serratus wall slides YTB 3  x12  ER no money red band with flexion 20x 3" hold  IR walkouts GTB 2 x 15  ER walkouts OTB 2 x 15  Pushup shoulder taps 20x ea  Supine IR/ER controlled motion 2# 3 x 15  Supine upside down KB 5# serratus press 3 x 15  Prone extensions 2# 3 x 15  Prone IR 2# 3 x 15  Supine ER rhythmic stab GTB 3x to fatigue    Marie received the following manual therapy techniques: Joint mobilizations and PROM were applied to the: left shoulder for 24 minutes, including:    PROM shoulder flexion/IR/ER  Inferior GHJ mobilizations grade " III-IV  Cross body MET  STM to subscap   Rhythmic stab IR/ER       Education provided:   - HEP, current condition, PT PLAN      Assessment     Progressed loading to the shoulder overhead. Added cuff activation step backs facing the band, subscap stabilization, and functional loading with cues to avoid hiking uT    Marie Is progressing well towards her goals.   Pt prognosis is Good.     Pt will continue to benefit from skilled outpatient physical therapy to address the deficits listed in the problem list box on initial evaluation, provide pt/family education and to maximize pt's level of independence in the home and community environment.     Pt's spiritual, cultural and educational needs considered and pt agreeable to plan of care and goals.    Anticipated barriers to physical therapy: none    Goals:   Short term goals: 2 weeks  Patient will become independent in HEP for maximal gains made in PT. - MET  Patient to improve AROM flexion to 160 for improvements in reaching activities. - MET        Long term goals: 12 weeks  Patient to demo 180 degrees of AROM flexion for improved overhead tasks.   Patient will improve strength to 5/5 for improved performance of household duties.  Patient will lift and carry 65 lb for safe transfer of daughter.    Plan     Focus on obtaining full ROM and maximize scapular and RC strength    Jeferson Persaud, PT , DPT, OCS

## 2024-02-21 ENCOUNTER — CLINICAL SUPPORT (OUTPATIENT)
Dept: REHABILITATION | Facility: HOSPITAL | Age: 49
End: 2024-02-21
Payer: COMMERCIAL

## 2024-02-21 DIAGNOSIS — M25.512 ACUTE PAIN OF LEFT SHOULDER: Primary | ICD-10-CM

## 2024-02-21 PROCEDURE — 97140 MANUAL THERAPY 1/> REGIONS: CPT | Performed by: PHYSICAL THERAPIST

## 2024-02-21 PROCEDURE — 97530 THERAPEUTIC ACTIVITIES: CPT | Performed by: PHYSICAL THERAPIST

## 2024-02-21 NOTE — PROGRESS NOTES
"  Physical Therapy Daily Treatment Note     Name: Marie Taylor  Clinic Number: [id number]    Therapy Diagnosis:   Encounter Diagnosis   Name Primary?    Acute pain of left shoulder Yes     Physician: Reji Domínguez MD    Visit Date: 2/21/2024  Physician Orders: PT Eval and Treat   Medical Diagnosis from Referral: M75.01 (ICD-10-CM) - Adhesive capsulitis of right shoulder  Evaluation Date: 3/8/2023  Authorization Period Expiration: 12/31/2024  Plan of Care Expiration: 5/21/2024  Visit # / Visits authorized: 5/ 20     Time In: 1105  Time Out: 1200  Total Appointment Time (timed & untimed codes): 55 minutes     Precautions: Standard    Subjective     Pt reports: I did really well with 2 weeks off, exercise a few times and not hurting in my arm    She was compliant with home exercise program.  Response to previous treatment: less pain  Functional change: improved overhead movement    Pain: 0/10  Location: left shoulder      Objective   Extender used: trained physical therapy technician    Shoulder flexion PROM pre-session: 178 degrees  Shoulder flexion PROM post-session: 180 degrees    Marie received therapeutic activities to develop strength, endurance, ROM, flexibility, and posture for 36 minutes including:    UBE 10' scap mobility and strengthening, level 4  Sidelying shoulder ER 1# 3x12  Scaptions 2# 3 x 12  ER no money green band with flexion 20x 3" hold  Prone extensions 2# 3 x 15  Prone row 2# 3 x 15    NT  Genie IR OTB 2 x 15 90/90 in front  ER step backs OTB 90/90 3 x 12  Serratus wall slides YTB 3  x12  IR walkouts GTB 2 x 15  ER walkouts OTB 2 x 15  Pushup shoulder taps 20x ea  Supine IR/ER controlled motion 2# 3 x 15  Supine upside down KB 5# serratus press 3 x 15  Prone extensions 2# 3 x 15  Prone IR 2# 3 x 15  Supine ER rhythmic stab GTB 3x to fatigue    Marie received the following manual therapy techniques: Joint mobilizations and PROM were applied to the: left shoulder for 24 minutes, " including:    PROM shoulder flexion/IR/ER  Inferior GHJ mobilizations grade III-IV  Cross body MET  STM to subscap   Rhythmic stab IR/ER       Education provided:   - HEP, current condition, PT PLAN      Assessment     Progressed loading to cuff. The joint capsule was great today, no pain with IR and better strength seen with resisted subscap activation. Plan to return in 2 weeks and then 1x/month going forward for maintenance to the shoulder    Marie Is progressing well towards her goals.   Pt prognosis is Good.     Pt will continue to benefit from skilled outpatient physical therapy to address the deficits listed in the problem list box on initial evaluation, provide pt/family education and to maximize pt's level of independence in the home and community environment.     Pt's spiritual, cultural and educational needs considered and pt agreeable to plan of care and goals.    Anticipated barriers to physical therapy: none    Goals:   Short term goals: 2 weeks  Patient will become independent in HEP for maximal gains made in PT. - MET  Patient to improve AROM flexion to 160 for improvements in reaching activities. - MET        Long term goals: 12 weeks  Patient to demo 180 degrees of AROM flexion for improved overhead tasks.   Patient will improve strength to 5/5 for improved performance of household duties.  Patient will lift and carry 65 lb for safe transfer of daughter.    Plan     Focus on obtaining full ROM and maximize scapular and RC strength    Jeferson Persaud, PT , DPT, OCS

## 2024-03-06 ENCOUNTER — CLINICAL SUPPORT (OUTPATIENT)
Dept: REHABILITATION | Facility: HOSPITAL | Age: 49
End: 2024-03-06
Payer: COMMERCIAL

## 2024-03-06 DIAGNOSIS — M25.512 ACUTE PAIN OF LEFT SHOULDER: Primary | ICD-10-CM

## 2024-03-06 PROCEDURE — 97140 MANUAL THERAPY 1/> REGIONS: CPT | Performed by: PHYSICAL THERAPIST

## 2024-03-06 PROCEDURE — 97530 THERAPEUTIC ACTIVITIES: CPT | Performed by: PHYSICAL THERAPIST

## 2024-03-27 ENCOUNTER — CLINICAL SUPPORT (OUTPATIENT)
Dept: REHABILITATION | Facility: HOSPITAL | Age: 49
End: 2024-03-27
Payer: COMMERCIAL

## 2024-03-27 DIAGNOSIS — M25.512 ACUTE PAIN OF LEFT SHOULDER: Primary | ICD-10-CM

## 2024-03-27 PROCEDURE — 97530 THERAPEUTIC ACTIVITIES: CPT | Performed by: PHYSICAL THERAPIST

## 2024-03-27 PROCEDURE — 97140 MANUAL THERAPY 1/> REGIONS: CPT | Performed by: PHYSICAL THERAPIST

## 2024-03-27 NOTE — PROGRESS NOTES
"    Physical Therapy Daily Treatment Note     Name: Marie Taylor  Clinic Number: [id number]    Therapy Diagnosis:   Encounter Diagnosis   Name Primary?    Acute pain of left shoulder Yes     Physician: Reji Domínguez MD    Visit Date: 3/27/2024  Physician Orders: PT Eval and Treat   Medical Diagnosis from Referral: M75.01 (ICD-10-CM) - Adhesive capsulitis of right shoulder  Evaluation Date: 3/8/2023  Authorization Period Expiration: 12/31/2024  Plan of Care Expiration: 5/21/2024  Visit # / Visits authorized: 7/ 20     Time In: 1102  Time Out: 1200  Total Appointment Time (timed & untimed codes): 58 minutes     Precautions: Standard    Subjective     Pt reports: My shoulder has been doing really well the past 3 weeks, just the pollen got me last week    She was compliant with home exercise program.  Response to previous treatment: less pain  Functional change: improved overhead movement    Pain: 0/10  Location: left shoulder      Objective   Extender used: trained physical therapy technician    Shoulder flexion PROM pre-session: 178 degrees  Shoulder flexion PROM post-session: 180 degrees    Marie received therapeutic activities to develop strength, endurance, ROM, flexibility, and posture for 36 minutes including:    Sidelying shoulder ER 2# 3x12  Supine ER with leg extension -conc/ecc with iso holds 4 x 15  IR/ER 3# controlled arc 30x    NT  Serratus wall slide YTB 3 x 12  Serratus wall clock YTB 3 x 12  Scaptions 2# 3 x 12  ER no money green band with flexion 20x 3" hold  Prone extensions 2# 3 x 15  Prone row 2# 3 x 15  UBE 10' scap mobility and strengthening, level 4  Genie IR OTB 2 x 15 90/90 in front  ER step backs OTB 90/90 3 x 12  Serratus wall slides YTB 3  x12  IR walkouts GTB 2 x 15  ER walkouts OTB 2 x 15  Pushup shoulder taps 20x ea  Supine IR/ER controlled motion 2# 3 x 15  Supine upside down KB 5# serratus press 3 x 15  Prone extensions 2# 3 x 15  Prone IR 2# 3 x 15  Supine ER rhythmic stab " GTB 3x to fatigue    Marie received the following manual therapy techniques: Joint mobilizations and PROM were applied to the: left shoulder for 24 minutes, including:    PROM shoulder flexion/IR/ER  Inferior GHJ mobilizations grade III-IV  Cross body MET  STM to subscap   Rhythmic stab IR/ER       Education provided:   - HEP, current condition, PT PLAN      Assessment     Shoulder is doing well with slight discomfort at her end range of ER. She gets relief with centering glide and posterior capsule stretch. Continue to strengthen cuff, plans to continue PT 1x a month for manual therapy relief and updated HEP    Marie Is progressing well towards her goals.   Pt prognosis is Good.     Pt will continue to benefit from skilled outpatient physical therapy to address the deficits listed in the problem list box on initial evaluation, provide pt/family education and to maximize pt's level of independence in the home and community environment.     Pt's spiritual, cultural and educational needs considered and pt agreeable to plan of care and goals.    Anticipated barriers to physical therapy: none    Goals:   Short term goals: 2 weeks  Patient will become independent in HEP for maximal gains made in PT. - MET  Patient to improve AROM flexion to 160 for improvements in reaching activities. - MET        Long term goals: 12 weeks  Patient to demo 180 degrees of AROM flexion for improved overhead tasks.   Patient will improve strength to 5/5 for improved performance of household duties.  Patient will lift and carry 65 lb for safe transfer of daughter.    Plan     Focus on obtaining full ROM and maximize scapular and RC strength    Jeferson Persaud, PT , DPT, OCS